# Patient Record
Sex: FEMALE | Race: WHITE | Employment: FULL TIME | URBAN - NONMETROPOLITAN AREA
[De-identification: names, ages, dates, MRNs, and addresses within clinical notes are randomized per-mention and may not be internally consistent; named-entity substitution may affect disease eponyms.]

---

## 2017-01-30 RX ORDER — LIOTHYRONINE SODIUM 25 UG/1
12.5 TABLET ORAL DAILY
Qty: 45 TABLET | Refills: 3 | Status: SHIPPED | OUTPATIENT
Start: 2017-01-30 | End: 2021-07-02

## 2017-11-07 ENCOUNTER — HOSPITAL ENCOUNTER (OUTPATIENT)
Dept: INTERVENTIONAL RADIOLOGY/VASCULAR | Age: 62
Discharge: HOME | End: 2017-11-07
Payer: COMMERCIAL

## 2017-11-07 DIAGNOSIS — D68.51 FACTOR 5 LEIDEN MUTATION, HETEROZYGOUS (HCC): ICD-10-CM

## 2017-11-07 PROCEDURE — 93970 EXTREMITY STUDY: CPT

## 2018-01-09 ENCOUNTER — HOSPITAL ENCOUNTER (OUTPATIENT)
Age: 63
Discharge: HOME | End: 2018-01-09
Payer: COMMERCIAL

## 2018-01-09 LAB
BACTERIA: ABNORMAL
BILIRUBIN URINE: NEGATIVE
BLOOD, URINE: NEGATIVE
CASTS: ABNORMAL /LPF
CASTS: ABNORMAL /LPF
CHARACTER, URINE: CLEAR
COLOR: YELLOW
CRYSTALS: ABNORMAL
EPITHELIAL CELLS, UA: ABNORMAL /HPF
GLUCOSE, URINE: NEGATIVE MG/DL
KETONES, URINE: NEGATIVE
LEUKOCYTE EST, POC: ABNORMAL
MISCELLANEOUS LAB TEST RESULT: ABNORMAL
NITRITE, URINE: NEGATIVE
PH UA: 5.5
PROTEIN UA: NEGATIVE MG/DL
RBC URINE: ABNORMAL /HPF
RENAL EPITHELIAL, UA: ABNORMAL
SPECIFIC GRAVITY UA: 1.01 (ref 1–1.03)
UROBILINOGEN, URINE: 0.2 EU/DL
WBC UA: ABNORMAL /HPF
YEAST: ABNORMAL

## 2018-01-09 PROCEDURE — 87086 URINE CULTURE/COLONY COUNT: CPT

## 2018-01-09 PROCEDURE — 81001 URINALYSIS AUTO W/SCOPE: CPT

## 2018-01-10 LAB
ORGANISM: ABNORMAL
URINE CULTURE, ROUTINE: ABNORMAL

## 2018-01-19 ENCOUNTER — HOSPITAL ENCOUNTER (OUTPATIENT)
Age: 63
Setting detail: SPECIMEN
Discharge: HOME | End: 2018-01-19
Payer: COMMERCIAL

## 2018-01-19 LAB — INR BLD: 1.44 (ref 0.85–1.13)

## 2018-01-19 PROCEDURE — 85610 PROTHROMBIN TIME: CPT

## 2018-01-22 ENCOUNTER — HOSPITAL ENCOUNTER (OUTPATIENT)
Age: 63
Setting detail: SPECIMEN
Discharge: HOME | End: 2018-01-22
Payer: COMMERCIAL

## 2018-01-22 LAB — INR BLD: 1.88 (ref 0.85–1.13)

## 2018-01-22 PROCEDURE — 85610 PROTHROMBIN TIME: CPT

## 2018-01-26 ENCOUNTER — HOSPITAL ENCOUNTER (OUTPATIENT)
Age: 63
Setting detail: SPECIMEN
Discharge: HOME | End: 2018-01-26
Payer: COMMERCIAL

## 2018-01-26 LAB — INR BLD: 1.82 (ref 0.85–1.13)

## 2018-01-26 PROCEDURE — 85610 PROTHROMBIN TIME: CPT

## 2018-01-29 ENCOUNTER — HOSPITAL ENCOUNTER (OUTPATIENT)
Age: 63
Setting detail: SPECIMEN
Discharge: HOME | End: 2018-01-29
Payer: COMMERCIAL

## 2018-01-29 LAB — INR BLD: 2.11 (ref 0.85–1.13)

## 2018-01-29 PROCEDURE — 85610 PROTHROMBIN TIME: CPT

## 2018-02-01 ENCOUNTER — HOSPITAL ENCOUNTER (OUTPATIENT)
Age: 63
Setting detail: SPECIMEN
Discharge: HOME | End: 2018-02-01
Payer: COMMERCIAL

## 2018-02-01 LAB — INR BLD: 2.22 (ref 0.85–1.13)

## 2018-02-01 PROCEDURE — 85610 PROTHROMBIN TIME: CPT

## 2018-02-08 ENCOUNTER — HOSPITAL ENCOUNTER (OUTPATIENT)
Age: 63
Setting detail: SPECIMEN
Discharge: HOME | End: 2018-02-08
Payer: COMMERCIAL

## 2018-02-09 ENCOUNTER — HOSPITAL ENCOUNTER (OUTPATIENT)
Age: 63
Setting detail: SPECIMEN
Discharge: HOME | End: 2018-02-09
Payer: COMMERCIAL

## 2018-02-09 LAB — INR BLD: 2.81 (ref 0.85–1.13)

## 2018-02-09 PROCEDURE — 85610 PROTHROMBIN TIME: CPT

## 2018-02-14 ENCOUNTER — HOSPITAL ENCOUNTER (OUTPATIENT)
Age: 63
Setting detail: SPECIMEN
Discharge: HOME | End: 2018-02-14
Payer: COMMERCIAL

## 2018-02-14 LAB — INR BLD: 1.71 (ref 0.85–1.13)

## 2018-02-14 PROCEDURE — 85610 PROTHROMBIN TIME: CPT

## 2018-09-25 ENCOUNTER — HOSPITAL ENCOUNTER (OUTPATIENT)
Dept: GENERAL RADIOLOGY | Age: 63
Discharge: HOME | End: 2018-09-25
Payer: COMMERCIAL

## 2018-09-25 ENCOUNTER — HOSPITAL ENCOUNTER (OUTPATIENT)
Age: 63
Discharge: HOME | End: 2018-09-25
Payer: COMMERCIAL

## 2018-09-25 DIAGNOSIS — M47.817 ARTHRITIS OF LUMBOSACRAL SPINE: ICD-10-CM

## 2018-09-25 PROCEDURE — 72110 X-RAY EXAM L-2 SPINE 4/>VWS: CPT

## 2019-06-11 ENCOUNTER — HOSPITAL ENCOUNTER (OUTPATIENT)
Age: 64
Discharge: HOME | End: 2019-06-11
Payer: COMMERCIAL

## 2019-06-11 ENCOUNTER — HOSPITAL ENCOUNTER (OUTPATIENT)
Dept: GENERAL RADIOLOGY | Age: 64
Discharge: HOME | End: 2019-06-11
Payer: COMMERCIAL

## 2019-06-11 DIAGNOSIS — M25.561 RIGHT KNEE PAIN, UNSPECIFIED CHRONICITY: ICD-10-CM

## 2019-06-11 PROCEDURE — 73564 X-RAY EXAM KNEE 4 OR MORE: CPT

## 2019-06-24 ENCOUNTER — HOSPITAL ENCOUNTER (OUTPATIENT)
Dept: MRI IMAGING | Age: 64
Discharge: HOME | End: 2019-06-24
Payer: COMMERCIAL

## 2019-06-24 DIAGNOSIS — M25.561 RIGHT KNEE PAIN, UNSPECIFIED CHRONICITY: ICD-10-CM

## 2019-06-24 PROCEDURE — 73721 MRI JNT OF LWR EXTRE W/O DYE: CPT

## 2019-07-15 ENCOUNTER — HOSPITAL ENCOUNTER (OUTPATIENT)
Dept: INTERVENTIONAL RADIOLOGY/VASCULAR | Age: 64
Discharge: HOME | End: 2019-07-15

## 2019-07-15 DIAGNOSIS — Z13.6 ENCOUNTER FOR SCREENING FOR VASCULAR DISEASE: ICD-10-CM

## 2019-07-15 PROCEDURE — 9900000021 US VASCULAR SCREENING

## 2019-08-12 ENCOUNTER — HOSPITAL ENCOUNTER (OUTPATIENT)
Dept: GENERAL RADIOLOGY | Age: 64
Discharge: HOME | End: 2019-08-12
Payer: COMMERCIAL

## 2019-08-12 ENCOUNTER — HOSPITAL ENCOUNTER (OUTPATIENT)
Age: 64
Discharge: HOME | End: 2019-08-12
Payer: COMMERCIAL

## 2019-08-12 DIAGNOSIS — S83.241S ACUTE MEDIAL MENISCUS TEAR OF RIGHT KNEE, SEQUELA: ICD-10-CM

## 2019-08-12 DIAGNOSIS — Z01.811 PRE-OP CHEST EXAM: ICD-10-CM

## 2019-08-12 LAB
ANION GAP SERPL CALCULATED.3IONS-SCNC: 10 MEQ/L (ref 8–16)
BUN BLDV-MCNC: 10 MG/DL (ref 7–22)
CALCIUM SERPL-MCNC: 9.5 MG/DL (ref 8.5–10.5)
CHLORIDE BLD-SCNC: 104 MEQ/L (ref 98–111)
CO2: 27 MEQ/L (ref 23–33)
CREAT SERPL-MCNC: 0.9 MG/DL (ref 0.4–1.2)
EKG ATRIAL RATE: 65 BPM
EKG P AXIS: 6 DEGREES
EKG P-R INTERVAL: 148 MS
EKG Q-T INTERVAL: 440 MS
EKG QRS DURATION: 76 MS
EKG QTC CALCULATION (BAZETT): 457 MS
EKG R AXIS: 42 DEGREES
EKG T AXIS: 55 DEGREES
EKG VENTRICULAR RATE: 65 BPM
ERYTHROCYTE [DISTWIDTH] IN BLOOD BY AUTOMATED COUNT: 13.4 % (ref 11.5–14.5)
ERYTHROCYTE [DISTWIDTH] IN BLOOD BY AUTOMATED COUNT: 45.2 FL (ref 35–45)
GFR SERPL CREATININE-BSD FRML MDRD: 63 ML/MIN/1.73M2
GLUCOSE BLD-MCNC: 81 MG/DL (ref 70–108)
HCT VFR BLD CALC: 42.6 % (ref 37–47)
HEMOGLOBIN: 13.2 GM/DL (ref 12–16)
MCH RBC QN AUTO: 28.5 PG (ref 26–33)
MCHC RBC AUTO-ENTMCNC: 31 GM/DL (ref 32.2–35.5)
MCV RBC AUTO: 92 FL (ref 81–99)
PLATELET # BLD: 312 THOU/MM3 (ref 130–400)
PMV BLD AUTO: 10.4 FL (ref 9.4–12.4)
POTASSIUM SERPL-SCNC: 4.6 MEQ/L (ref 3.5–5.2)
RBC # BLD: 4.63 MILL/MM3 (ref 4.2–5.4)
SODIUM BLD-SCNC: 141 MEQ/L (ref 135–145)
WBC # BLD: 5.8 THOU/MM3 (ref 4.8–10.8)

## 2019-08-12 PROCEDURE — 93005 ELECTROCARDIOGRAM TRACING: CPT | Performed by: ORTHOPAEDIC SURGERY

## 2019-08-12 PROCEDURE — 71046 X-RAY EXAM CHEST 2 VIEWS: CPT

## 2019-08-12 PROCEDURE — 36415 COLL VENOUS BLD VENIPUNCTURE: CPT

## 2019-08-12 PROCEDURE — 85027 COMPLETE CBC AUTOMATED: CPT

## 2019-08-12 PROCEDURE — 93010 ELECTROCARDIOGRAM REPORT: CPT | Performed by: INTERNAL MEDICINE

## 2019-08-12 PROCEDURE — 80048 BASIC METABOLIC PNL TOTAL CA: CPT

## 2021-05-28 LAB
BILIRUBIN URINE: NORMAL
BLOOD, URINE: NEGATIVE
CLARITY: CLEAR
COLOR: YELLOW
GLUCOSE URINE: NEGATIVE
KETONES, URINE: NEGATIVE
LEUKOCYTE ESTERASE, URINE: NORMAL
NITRITE, URINE: NEGATIVE
PH UA: 6 (ref 4.5–8)
PROTEIN UA: NEGATIVE
SPECIFIC GRAVITY UA: 1.01 (ref 1–1.03)
UROBILINOGEN, URINE: NORMAL

## 2021-07-02 ENCOUNTER — OFFICE VISIT (OUTPATIENT)
Dept: UROLOGY | Age: 66
End: 2021-07-02
Payer: COMMERCIAL

## 2021-07-02 VITALS
WEIGHT: 241 LBS | HEIGHT: 67 IN | BODY MASS INDEX: 37.83 KG/M2 | DIASTOLIC BLOOD PRESSURE: 72 MMHG | SYSTOLIC BLOOD PRESSURE: 118 MMHG

## 2021-07-02 DIAGNOSIS — R32 URINARY INCONTINENCE, UNSPECIFIED TYPE: ICD-10-CM

## 2021-07-02 DIAGNOSIS — R32 ENURESIS: Primary | ICD-10-CM

## 2021-07-02 DIAGNOSIS — R31.29 MICROSCOPIC HEMATURIA: ICD-10-CM

## 2021-07-02 LAB
AMORPHOUS: ABNORMAL
BACTERIA: ABNORMAL
BILIRUBIN URINE: NEGATIVE
BILIRUBIN URINE: NEGATIVE
BLOOD URINE, POC: ABNORMAL
BLOOD, URINE: ABNORMAL
CASTS: ABNORMAL /LPF
CASTS: ABNORMAL /LPF
CHARACTER, URINE: ABNORMAL
CHARACTER, URINE: CLEAR
COLOR, URINE: YELLOW
COLOR: YELLOW
CRYSTALS: ABNORMAL
CRYSTALS: ABNORMAL
EPITHELIAL CELLS, UA: ABNORMAL /HPF
GLUCOSE URINE: NEGATIVE MG/DL
GLUCOSE, URINE: NEGATIVE MG/DL
KETONES, URINE: ABNORMAL
KETONES, URINE: NEGATIVE
LEUKOCYTE CLUMPS, URINE: NEGATIVE
LEUKOCYTE ESTERASE, URINE: NEGATIVE
MISCELLANEOUS LAB TEST RESULT: ABNORMAL
NITRITE, URINE: NEGATIVE
NITRITE, URINE: NEGATIVE
PH UA: 6 (ref 5–9)
PH, URINE: 5.5 (ref 5–9)
POST VOID RESIDUAL (PVR): 96 ML
PROTEIN UA: ABNORMAL MG/DL
PROTEIN, URINE: 30 MG/DL
RBC URINE: ABNORMAL /HPF
RENAL EPITHELIAL, UA: ABNORMAL
SPECIFIC GRAVITY UA: 1.02 (ref 1–1.03)
SPECIFIC GRAVITY, URINE: 1.02 (ref 1–1.03)
UROBILINOGEN, URINE: 1 EU/DL (ref 0–1)
UROBILINOGEN, URINE: 1 EU/DL (ref 0–1)
WBC UA: ABNORMAL /HPF
YEAST: ABNORMAL

## 2021-07-02 PROCEDURE — 81003 URINALYSIS AUTO W/O SCOPE: CPT | Performed by: NURSE PRACTITIONER

## 2021-07-02 PROCEDURE — 99204 OFFICE O/P NEW MOD 45 MIN: CPT | Performed by: NURSE PRACTITIONER

## 2021-07-02 PROCEDURE — 51798 US URINE CAPACITY MEASURE: CPT | Performed by: NURSE PRACTITIONER

## 2021-07-02 RX ORDER — OXYBUTYNIN CHLORIDE 5 MG/1
5 TABLET, EXTENDED RELEASE ORAL DAILY
Qty: 30 TABLET | Refills: 2 | Status: SHIPPED | OUTPATIENT
Start: 2021-07-02 | End: 2021-08-13

## 2021-07-02 RX ORDER — GABAPENTIN 600 MG/1
600 TABLET ORAL 3 TIMES DAILY
COMMUNITY

## 2021-07-02 ASSESSMENT — ENCOUNTER SYMPTOMS
ABDOMINAL PAIN: 0
VOMITING: 0
BACK PAIN: 0
NAUSEA: 0

## 2021-07-02 NOTE — PROGRESS NOTES
[address] [address] [place] [id number]  Dept: [phone]  Loc: [phone]    Visit Date: 7/2/2021        HPI:     Yusuf Damon is a 77 y.o. female who presents today for:  Chief Complaint   Patient presents with    Advice Only     new pt urinary incontinence and enuresis        HPI   Pt referred to our office for urinary incontinence. Pt reports some urinary incontinence for years that has worsened in the last year. Denies significant frequency or urgency. Goes to the bathroom approximately 5 x per day. Really just has episodes of incontinence. Reports some stress incontinence but the episodes are really not associated with urgency. Notes \"it happens and I don't really have control\". She wears protection. Reports she has had to increase to the #4 pads during the day and the #5 pads at night. Sometimes has incontinence without waking. Uses 1-2 pads per night and 1-2 per day. Drinks 1 large hot tea over the course of the morning and usually 3 root beers per day. Denies alcohol use. Has hx of 3 vaginal deliveries and a tubal ligation. No urologic surgeries. Notes a few scattered utis over the years but no consistent infections. She is a nonsmoker. Pt on coumadin for hx of factor V leiden and blood clots. No dysuria, abdominal or flank pain, fever/chills, gross hematuria. Current Outpatient Medications   Medication Sig Dispense Refill    gabapentin (NEURONTIN) 600 MG tablet Take 600 mg by mouth 3 times daily.  Ascorbic Acid (VITAMIN C PO) Take by mouth      ZINC PO Take by mouth daily      VITAMIN A PO Take by mouth daily      vitamin D (CHOLECALCIFEROL) 5000 UNITS CAPS capsule Take 7,500 Units by mouth daily Alternate between 5000 and 25895      TraMADol HCl  MG CP24 Take 100 mg by mouth 3 times daily.  atorvastatin (LIPITOR) 40 MG tablet Take 40 mg by mouth daily.  warfarin (COUMADIN) 5 MG tablet Take by mouth Fluctuates due to factor 5, controlled by Dr. Marybel Ortega       No current facility-administered medications for this visit. Past Medical History  Brittany Rose  has a past medical history of Arthritis, Factor V deficiency ([address] Hx of blood clots, Hyperlipidemia, and Hypothyroidism due to acquired atrophy of thyroid. Past Surgical History  The patient  has a past surgical history that includes Toe Surgery (2004? ?); Tubal ligation (1996); Colonoscopy; Foot surgery (Left, 09/24/14); and joint replacement (2009/2018). Family History  This patient's family history includes COPD in her father; Cancer in her father and paternal grandmother; Heart Attack in her paternal grandfather; Heart Disease in her paternal grandfather; High Blood Pressure in her father and mother; High Cholesterol in her mother; Other in her maternal grandmother, mother, and sister; Stroke in her maternal grandfather. Social History  Brittany Rose  reports that she has never smoked. She has never used smokeless tobacco. She reports current alcohol use. She reports that she does not use drugs. Subjective:      Review of Systems   Constitutional: Negative for activity change, appetite change, chills, diaphoresis, fatigue, fever and unexpected weight change. Gastrointestinal: Negative for abdominal pain, nausea and vomiting. Genitourinary: Negative for decreased urine volume, difficulty urinating, dysuria, flank pain, frequency, hematuria and urgency. Musculoskeletal: Negative for back pain. Objective:   /72   Ht 5' 7\" (1.702 m)   Wt 241 lb (109.3 kg)   BMI 37.75 kg/m²     Physical Exam  Vitals reviewed. Constitutional:       General: She is not in acute distress. Appearance: Normal appearance. She is well-developed. She is not ill-appearing or diaphoretic. HENT:      Head: Normocephalic and atraumatic.       Right Ear: External ear normal.      Left Ear: External ear normal.      Nose: Nose normal.      Mouth/Throat:      Mouth: Mucous membranes are moist.   Eyes:      General: No scleral icterus. Right eye: No discharge. Left eye: No discharge. Neck:      Vascular: No JVD. Trachea: No tracheal deviation. Pulmonary:      Effort: Pulmonary effort is normal. No respiratory distress. Abdominal:      General: There is no distension. Tenderness: There is no abdominal tenderness. There is no right CVA tenderness or left CVA tenderness. Musculoskeletal:         General: No tenderness. Normal range of motion. Neurological:      Mental Status: She is alert and oriented to person, place, and time. Mental status is at baseline. Psychiatric:         Mood and Affect: Mood normal.         Behavior: Behavior normal.         Thought Content: Thought content normal.         POC  Results for POC orders placed in visit on 07/02/21   POCT Urinalysis No Micro (Auto)   Result Value Ref Range    Glucose, Ur Negative NEGATIVE mg/dl    Bilirubin Urine Negative     Ketones, Urine Trace (A) NEGATIVE    Specific Gravity, Urine 1.025 1.002 - 1.030    Blood, UA POC Large (A) NEGATIVE    pH, Urine 5.50 5.0 - 9.0    Protein, Urine 30 (A) NEGATIVE mg/dl    Urobilinogen, Urine 1.00 0.0 - 1.0 eu/dl    Nitrite, Urine Negative NEGATIVE    Leukocyte Clumps, Urine Negative NEGATIVE    Color, Urine Yellow YELLOW-STRAW    Character, Urine Clear CLR-SL.CLOUD   poct post void residual   Result Value Ref Range    post void residual 96 ml     Patients recent PSA values are as follows  No results found for: PSA, PSADIA     Recent BUN/Creatinine:  Lab Results   Component Value Date    BUN 10 08/12/2019    CREATININE 0.9 08/12/2019       Assessment:   Incontinence with enuresis  Micro hematuria on urine dip    Plan:     Pt's PVR on high end of normal at 96 mls. Pt denies feeling of incomplete emptying or urinary retention.   Discussed voiding techniques and timed voiding of every 3-4 hours during the day time. Repeat PVR at next visit. Trial oxybutynin 5 mg XL daily. Discussed side effects. Send urine for micro and culture.      F/u in 4-6 weeks with PVR

## 2021-07-30 ENCOUNTER — HOSPITAL ENCOUNTER (OUTPATIENT)
Dept: PHYSICAL THERAPY | Age: 66
Setting detail: THERAPIES SERIES
Discharge: HOME | End: 2021-07-30
Payer: COMMERCIAL

## 2021-07-30 PROCEDURE — 97162 PT EVAL MOD COMPLEX 30 MIN: CPT

## 2021-07-30 NOTE — PROGRESS NOTES
** PLEASE SIGN, DATE AND TIME CERTIFICATION BELOW AND RETURN TO [facility] OUTPATIENT REHABILITATION (FAX #: [phone]). ATTEST/CO-SIGN IF ACCESSING VIA [employer]. THANK YOU.**    I certify that I have examined the patient below and determined that Physical Medicine and Rehabilitation service is necessary and that I approve the established plan of care for up to 90 days or as specifically noted. Attestation, signature or co-signature of physician indicates approval of certification requirements.    ________________________ ____________ __________  Physician Signature   Date   Time  [address]  PHYSICAL THERAPY  [x] EVALUATION  [] DAILY NOTE (LAND) [] DAILY NOTE (AQUATIC ) [] PROGRESS NOTE [] DISCHARGE NOTE    [x] [facility]   [] [place] [address]    [] [facility]   [] Pinky Lefort    Date: 2021  Patient Name:  Bhargavi Bernal  : 1955  MRN: [id number]  CSN: [id number]    Referring Practitioner Lloyd Ohara PA-C   Diagnosis Trochanteric bursitis, unspecified hip [M70.60]  Sacrococcygeal disorders, not elsewhere classified [M53.3]  Spondylosis without myelopathy or radiculopathy, lumbosacral region [M47.817]    Treatment Diagnosis Bilateral hip pain, lumbosacral pain with limited functional mobility with walking, transfers, unable to lie on left/right side. Date of Evaluation 21    Additional Pertinent History Right THR 2018, left THR ,  incontinence, obesity, arthritis. Kellen Rides off bike 2021 right knee pain      Functional Outcome Measure Used Modified Oswestry   Functional Outcome Score 40% 20/50 (21)       Insurance: Primary: Payor: Hakeem Loredo 150 /  /  / ,   Secondary:    Authorization Information: No precertification, no visit limit, aquatic covered, modalities covered, telehealth covered.     Visit # 1, 1/10 for progress note   Visits Allowed: Based on medical necessity    Recertification Date: 9/24/2021   Physician Follow-Up:    Physician Orders: PT 3x per week for 6 weeks evaluate and treat. See above for diagnosis. History of Present Illness: Patient has had low back pain >10 years. Follows with chiropractor 1x per month to manage pain and alignment. Patient reports of DDD at lumbar spine. Has trochanteric bursits in both hips within last 6 months to 1 year. SUBJECTIVE: Patient reports of back pain at waist height and below. Reports of numbness, tingling and burning at left thigh since left THR 2009. Back pain is aggravated with walking >10 minutes, sitting depending on surface she is sitting on can sit >2 hours in recliner chair. Cannot lay on right/left sides due to bursitis. Bending, lifting and pushing vacuum can aggravate back as well. To make back feel better she changes position and sits down. Most comfortable sitting in recliner. She does have an adjustable bed in which she elevates head of bed and flexes at hips/knees. Tramadol, Gabapentin for back pain. Bilateral hip pain:  cannot lay on her sides, standing/walking, pivoting and twisting can aggravate hip bursitis. Does have to watch how she gets in the car and cannot swing legs into car. Has to sit on side of seat and lift legs in. SUV seats make it easier to get into car. To ease pain she changes positions and does not lay on her sides. Injection in both hips in May of 2021 which did help but is wearing off. Biofreeze at times to help discomfort. Social/Functional History and Current Status:  Medications and Allergies have been reviewed and are listed on Medical History Questionnaire. Errol Kirk lives with spouse, who had stroke right sided weakness in a multiple floor home with 5 steps with railing. .    Task Previous Current   ADLs  Independent Independent   IADL's Independent Modified Independent careful with sit<>stand transfers, difficulty vacuuming, bending forward, limits lifting.     Ambulation Independent Modified Independent uses cane if hips bother her too much. History of bulging disc and then uses cane as well. Transfers Independent Modified Independent   Recreation crocheting with prolonged sitting, sewing, crafting Independent   Community Integration Independent Independent   Driving Active  Active    Work  full time with prolonged sitting, desk/computerwork. Full-Time. see previous. OBJECTIVE:    Pain: Lumbar pain 2/10 across lower back waist and below. Hip bursitis right/left greater trochanter 6/10. Palpation Note tenderness lumbar sacral region. [id number] levels and laterally. Tenderness along bilateral IT band regions right/left. Greater trochanter region right/left. IT band region proximal to mid lateral thigh   Observation Note use of armrests of chair to sit to stand with delayed timing in sit to stand and straightening back/spine. Posture Note left iliac crest higher than left with slight lateral shift of trunk. (patient reports of leg length descrepency since THR left) Note reduced lumbar lordotic curve. Note genu valgus of LEs. Range of Motion Trunk Forward flexion 20 inches fingertips from floor with 75% restriction, extension only at neutral. Extension/flexion aggravate lumbar spine. HIp ROM right 115 degrees flexion SLR 60 degrees, left 90 degrees flexion SLR 45 degrees. (s/p THR bilaterally)    Strength MMT not assessed at hip flexors due to painful with AROM, knee and ankle strength bilaterally WNLS. 5/5, hip abduction 4/5 to 4-/5, hip extensors 4-/5. Oliva Sarah Sensation Constant left anterior lateral thigh since left THR. Otherwise sensation normal in LEs. Bed Mobility Modified independently. Transfers Modified independent: sit <>stand with use of UEs for support. Ambulation Ambulates with decreased wt shifts left >right, Trendelenberg. Balance SLS right 5 seconds, left 8 seconds, Tandem stance r/l, l/r >30 seconds.     Special Tests Negative neural tension test right/left LEs. Movement testing: optimal lumbar neutral with limits of extension past neutral, Position of comfort supine in hooklying. TREATMENT   Precautions: History of left/right THR. NO US. Pain: 2/10 lumbosacral 6/10 bilateral hips. X in shaded column indicates activity completed today   Modalities Parameters/  Location  Notes                     Manual Therapy Time/Technique  Notes                     Exercise/Intervention   Notes                                                                                  Specific Interventions Next Treatment: Manual therapy soft tissue mobilization bilateral IT band region right/left, core control/strengthening, hip strengthening for muscle imbalance, balance retraining/proprioception. Activity/Treatment Tolerance:  [x]  Patient tolerated treatment well  []  Patient limited by fatigue  []  Patient limited by pain   []  Patient limited by medical complications  []  Other:     Assessment: Patient referred to PT with bilateral hip and lumbosacral pain. Noted muscle imbalances with decreased strength at hip abductors, ER and hip extensors bilaterally. Decreased core strength and lumbar pain and bilateral hip pain and IT band tenderness. Will follow 2-3x per week to address these deficits. Body Structures/Functions/Activity Limitations: impaired activity tolerance, impaired balance, impaired ROM, impaired strength, pain and abnormal posture  Prognosis: good    GOALS:  Patient Goal: Less pain at hips and back. Be able to lie on right/left sides. Walk >10 minutes    Short Term Goals:  Time Frame: 4 weeks  1. Patient to report of decreased pain symptoms at lumbosacral region with pain levels from 2/10 to 0/10 with walking, bending forward, lifting and vacuuming.   2. Patient to report of decreased bilateral hip pain with improved strength at hip musculature with decreased strain at hips at abductors with pain level at hips from 6/10 to 3/10. 3. Patient to demonstrate increased core control with increased strength at abdominal and trunk muscles with ability to complete ex in optimal lumbar neutral position for a series of exercises at 15 reps each in supine in hooklying, seated and standing positions. 4. Patient to demonstrate increased bilateral hip strength at hip abductors 4-/5 to 4/5, hip flexors 4-/5 to 4/5, hip extension 3+/5 to 4-/5 to 4/5 with improved muscle balance at hip and trunk muscles and progression in ex noted. 5. Note increased balance in SLS 5-8 seconds to 10-12 seconds with improved balance through LLE and RLE. Tandem stance from 30 seconds to 45 seconds. Long Term Goals:  Time Frame: 8 weeks  1. Oswestry 40% to 20%. 2. Patient independent in HEP with follow through in order to increase strength core control, hip strength to correct muscle imbalances, decrease pain at bilateral hips. Patient Education:   [x]  HEP/Education Completed: Plan of Care, Goals,    Medbridge Access Code:  []  No new Education completed  []  Reviewed Prior HEP      []  Patient verbalized and/or demonstrated understanding of education provided. []  Patient unable to verbalize and/or demonstrate understanding of education provided. Will continue education. [x]  Barriers to learning: none    PLAN:  Treatment Recommendations: Strengthening, Range of Motion, Balance Training, Functional Mobility Training, Transfer Training, Neuromuscular Re-education, Manual Therapy - Soft Tissue Mobilization, Pain Management, Home Exercise Program, Patient Education, Aquatics and Modalities    [x]  Plan of care initiated. Plan to see patient 2-3 times per week for 8 weeks to address the treatment planned outlined above.   []  Continue with current plan of care  []  Modify plan of care as follows:    []  Hold pending physician visit  []  Discharge    Time In 1310   Time Out 1350   Timed Code Minutes: 0 min   Total Treatment Time: 40 min Electronically Signed by: Sims Mcardle, PT

## 2021-08-03 ENCOUNTER — HOSPITAL ENCOUNTER (OUTPATIENT)
Dept: PHYSICAL THERAPY | Age: 66
Setting detail: THERAPIES SERIES
Discharge: HOME | End: 2021-08-03
Payer: COMMERCIAL

## 2021-08-03 PROCEDURE — 97110 THERAPEUTIC EXERCISES: CPT

## 2021-08-03 PROCEDURE — 97140 MANUAL THERAPY 1/> REGIONS: CPT

## 2021-08-03 NOTE — PROGRESS NOTES
[address] [facility]  PHYSICAL THERAPY  [] EVALUATION  [x] DAILY NOTE (LAND) [] DAILY NOTE (AQUATIC ) [] PROGRESS NOTE [] DISCHARGE NOTE    [x] OUTPATIENT REHABILITATION CENTER [facility]   [] [place] [address]    [] [facility]   [] Merlene Humphrey    Date: 8/3/2021  Patient Name:  Jerrod Franklin  : 1955  MRN: [id number]  CSN: [id number]    Referring Practitioner Charleen Lynn PA-C   Diagnosis Trochanteric bursitis, unspecified hip [M70.60]  Sacrococcygeal disorders, not elsewhere classified [M53.3]  Spondylosis without myelopathy or radiculopathy, lumbosacral region [M47.817]    Treatment Diagnosis Bilateral hip pain, lumbosacral pain with limited functional mobility with walking, transfers, unable to lie on left/right side. Date of Evaluation 21    Additional Pertinent History Right THR 2018, left THR ,  incontinence, obesity, arthritis. Merly Half off bike 2021 right knee pain      Functional Outcome Measure Used Modified Oswestry   Functional Outcome Score 40% 20/50 (21)       Insurance: Primary: Payor: Tristen Crowell /  /  / ,   Secondary:    Authorization Information: No precertification, no visit limit, aquatic covered, modalities covered, telehealth covered. Visit # 2, 2/10 for progress note   Visits Allowed: Based on medical necessity    Recertification Date: 3/45/9790   Physician Follow-Up:    Physician Orders: PT 3x per week for 6 weeks evaluate and treat. See above for diagnosis. History of Present Illness: Patient has had low back pain >10 years. Follows with chiropractor 1x per month to manage pain and alignment. Patient reports of DDD at lumbar spine. Has trochanteric bursits in both hips within last 6 months to 1 year. SUBJECTIVE: Patient reports back pain 5/10 today along B lower lumbar/SI area and into hips. Patient has more pain along L hip/SI area today than R side.     Social/Functional History and Current Status:  Medications and Allergies have been reviewed and are listed on Medical History Questionnaire. Alberto Villagomez lives with spouse, who had stroke right sided weakness in a multiple floor home with 5 steps with railing. .      TREATMENT   Precautions: History of left/right THR. NO US. Pain: 2/10 lumbosacral 5/10 bilateral hips. X in shaded column indicates activity completed today   Modalities Parameters/  Location  Notes                     Manual Therapy Time/Technique  Notes   Manual therapy soft tissue mobilization bilateral IT band region B along with B SI area 10 min  Patient prone with 1 pillow under chest               Exercise/Intervention   Notes   Abdominal bracing  5 sec 10x X Cues for breathing    Pelvic tilt 5 sec 10x X  Cues for breathing          Lower trunk rolls  5 sec 5x                   Supine HS stretch 10 sec 3x  Hold towel behind knee                                 Specific Interventions Next Treatment: Manual therapy soft tissue mobilization bilateral IT band region right/left, core control/strengthening, hip strengthening for muscle imbalance, balance retraining/proprioception. Activity/Treatment Tolerance:  [x]  Patient tolerated treatment well  [x]  Patient limited by fatigue  []  Patient limited by pain   []  Patient limited by medical complications  []  Other:     Assessment: Patient received manual therapy soft tissue mobilization to bilateral IT band region B and along B SI area to decrease pain and soft tissue restriction. Initiated there ex including abdominal bracing, pelvic tilt, LTR and supine HS stretch. Patient required cues for abdominal bracing today. Patient given updated HEP with handouts to complete 2-3 times per day. Patient noted fatigue post treatment. Prognosis: good    GOALS:  Patient Goal: Less pain at hips and back. Be able to lie on right/left sides. Walk >10 minutes    Short Term Goals:  Time Frame: 4 weeks  1.  Patient to report of decreased pain symptoms at lumbosacral region with pain levels from 2/10 to 0/10 with walking, bending forward, lifting and vacuuming. 2. Patient to report of decreased bilateral hip pain with improved strength at hip musculature with decreased strain at hips at abductors with pain level at hips from 6/10 to 3/10. 3. Patient to demonstrate increased core control with increased strength at abdominal and trunk muscles with ability to complete ex in optimal lumbar neutral position for a series of exercises at 15 reps each in supine in hooklying, seated and standing positions. 4. Patient to demonstrate increased bilateral hip strength at hip abductors 4-/5 to 4/5, hip flexors 4-/5 to 4/5, hip extension 3+/5 to 4-/5 to 4/5 with improved muscle balance at hip and trunk muscles and progression in ex noted. 5. Note increased balance in SLS 5-8 seconds to 10-12 seconds with improved balance through LLE and RLE. Tandem stance from 30 seconds to 45 seconds. Long Term Goals:  Time Frame: 8 weeks  1. Oswestry 40% to 20%. 2. Patient independent in HEP with follow through in order to increase strength core control, hip strength to correct muscle imbalances, decrease pain at bilateral hips. Patient Education:   [x]  HEP/Education Completed: Patient given updated HO for HEP to be completed 2-3 times per day.  Pop Up Archive Access Code: [email or web address]  []  No new Education completed  []  Reviewed Prior HEP      []  Patient verbalized and/or demonstrated understanding of education provided. []  Patient unable to verbalize and/or demonstrate understanding of education provided. Will continue education.   [x]  Barriers to learning: none    PLAN:  Treatment Recommendations: Strengthening, Range of Motion, Balance Training, Functional Mobility Training, Transfer Training, Neuromuscular Re-education, Manual Therapy - Soft Tissue Mobilization, Pain Management, Home Exercise Program, Patient Education, Aquatics and Modalities    [x]  Plan of care initiated. Plan to see patient 2-3 times per week for 8 weeks to address the treatment planned outlined above.   []  Continue with current plan of care  []  Modify plan of care as follows:    []  Hold pending physician visit  []  Discharge    Time In 1530   Time Out 1615   Timed Code Minutes: 45min   Total Treatment Time: 45 min       Electronically Signed by: Carlos Cortez PTA

## 2021-08-04 ENCOUNTER — HOSPITAL ENCOUNTER (OUTPATIENT)
Dept: PHYSICAL THERAPY | Age: 66
Setting detail: THERAPIES SERIES
Discharge: HOME | End: 2021-08-04
Payer: COMMERCIAL

## 2021-08-04 PROCEDURE — 97110 THERAPEUTIC EXERCISES: CPT

## 2021-08-04 PROCEDURE — 97140 MANUAL THERAPY 1/> REGIONS: CPT

## 2021-08-04 NOTE — PROGRESS NOTES
[facility]  PHYSICAL THERAPY  [] EVALUATION  [x] DAILY NOTE (LAND) [] DAILY NOTE (AQUATIC ) [] PROGRESS NOTE [] DISCHARGE NOTE    [x] OUTPATIENT REHABILITATION CENTER [facility]   [] [place] [address]    [] [facility]   [] Millie Stanton    Date: 2021  Patient Name:  Alberto Villagomez  : 1955  MRN: [id number]  CSN: [id number]    Referring Practitioner Radha Hunter PA-C   Diagnosis Trochanteric bursitis, unspecified hip [M70.60]  Sacrococcygeal disorders, not elsewhere classified [M53.3]  Spondylosis without myelopathy or radiculopathy, lumbosacral region [M47.817]    Treatment Diagnosis Bilateral hip pain, lumbosacral pain with limited functional mobility with walking, transfers, unable to lie on left/right side. Date of Evaluation 21    Additional Pertinent History Right THR 2018, left THR ,  incontinence, obesity, arthritis. Alonza Citlalli off bike 2021 right knee pain      Functional Outcome Measure Used Modified Oswestry   Functional Outcome Score 40% 20/50 (21)       Insurance: Primary: Payor: [facility] /  /  / ,   Secondary:    Authorization Information: No precertification, no visit limit, aquatic covered, modalities covered, telehealth covered. Visit # 3, 3/10 for progress note   Visits Allowed: Based on medical necessity    Recertification Date:    Physician Follow-Up:    Physician Orders: PT 3x per week for 6 weeks evaluate and treat. See above for diagnosis. History of Present Illness: Patient has had low back pain >10 years. Follows with chiropractor 1x per month to manage pain and alignment. Patient reports of DDD at lumbar spine. Has trochanteric bursits in both hips within last 6 months to 1 year. SUBJECTIVE: Patient denies any adverse effects after yesterday's session. TREATMENT   Precautions: History of left/right THR. NO US.     Pain: 3/10 lumbosacral 5/10 bilateral hips L >R    X in shaded column indicates activity completed today   Modalities Parameters/  Location  Notes                     Manual Therapy Time/Technique  Notes   Manual therapy soft tissue mobilization bilateral IT band region B along with B SI area 15 min X Patient prone with 1 pillow under chest               Exercise/Intervention   Notes   Abdominal bracing  5 sec 10x X Cues for breathing    Pelvic tilt 5 sec 10x X  Cues for breathing and technique    Pelvic tilt with march, bent knee fall out B and U 10x  X    Lower trunk rolls  5 sec 5x X                  Supine HS stretch 15 sec 3x X Hold towel behind knee                                 Specific Interventions Next Treatment: Manual therapy soft tissue mobilization bilateral IT band region right/left, core control/strengthening, hip strengthening for muscle imbalance, balance retraining/proprioception. Activity/Treatment Tolerance:  [x]  Patient tolerated treatment well  [x]  Patient limited by fatigue  []  Patient limited by pain   []  Patient limited by medical complications  []  Other:     Assessment: Good erythemal response is achieved with manual interventions. Patient demonstrates good PPT technique and able to add progressions while maintaining good core stability. GOALS:  Patient Goal: Less pain at hips and back. Be able to lie on right/left sides. Walk >10 minutes    Short Term Goals:  Time Frame: 4 weeks  1. Patient to report of decreased pain symptoms at lumbosacral region with pain levels from 2/10 to 0/10 with walking, bending forward, lifting and vacuuming. 2. Patient to report of decreased bilateral hip pain with improved strength at hip musculature with decreased strain at hips at abductors with pain level at hips from 6/10 to 3/10.   3. Patient to demonstrate increased core control with increased strength at abdominal and trunk muscles with ability to complete ex in optimal lumbar neutral position for a series of exercises at 15 reps each in supine in hooklying, seated and standing positions. 4. Patient to demonstrate increased bilateral hip strength at hip abductors 4-/5 to 4/5, hip flexors 4-/5 to 4/5, hip extension 3+/5 to 4-/5 to 4/5 with improved muscle balance at hip and trunk muscles and progression in ex noted. 5. Note increased balance in SLS 5-8 seconds to 10-12 seconds with improved balance through LLE and RLE. Tandem stance from 30 seconds to 45 seconds. Long Term Goals:  Time Frame: 8 weeks  1. Oswestry 40% to 20%. 2. Patient independent in HEP with follow through in order to increase strength core control, hip strength to correct muscle imbalances, decrease pain at bilateral hips. Patient Education:   [x]  HEP/Education Completed: PPT with angelo bent knee fall outs    [address] [place] [address] Access Code: [email or web address]  []  No new Education completed  [x]  Reviewed Prior HEP      []  Patient verbalized and/or demonstrated understanding of education provided. []  Patient unable to verbalize and/or demonstrate understanding of education provided. Will continue education. [x]  Barriers to learning: none    PLAN:  Treatment Recommendations: Strengthening, Range of Motion, Balance Training, Functional Mobility Training, Transfer Training, Neuromuscular Re-education, Manual Therapy - Soft Tissue Mobilization, Pain Management, Home Exercise Program, Patient Education, Aquatics and Modalities    []  Plan of care initiated. Plan to see patient 2-3 times per week for 8 weeks to address the treatment planned outlined above.   [x]  Continue with current plan of care  []  Modify plan of care as follows:    []  Hold pending physician visit  []  Discharge    Time In 1300   Time Out 1345   Timed Code Minutes: 45 min   Total Treatment Time: 45 min       Electronically Signed by: Brenda Tim PTA

## 2021-08-11 ENCOUNTER — APPOINTMENT (OUTPATIENT)
Dept: PHYSICAL THERAPY | Age: 66
End: 2021-08-11
Payer: COMMERCIAL

## 2021-08-13 ENCOUNTER — HOSPITAL ENCOUNTER (OUTPATIENT)
Dept: PHYSICAL THERAPY | Age: 66
Setting detail: THERAPIES SERIES
Discharge: HOME | End: 2021-08-13
Payer: COMMERCIAL

## 2021-08-13 ENCOUNTER — OFFICE VISIT (OUTPATIENT)
Dept: UROLOGY | Age: 66
End: 2021-08-13
Payer: COMMERCIAL

## 2021-08-13 VITALS — BODY MASS INDEX: 37.83 KG/M2 | WEIGHT: 241 LBS | HEIGHT: 67 IN

## 2021-08-13 DIAGNOSIS — R32 URINARY INCONTINENCE, UNSPECIFIED TYPE: Primary | ICD-10-CM

## 2021-08-13 LAB
BILIRUBIN URINE: NEGATIVE
BLOOD URINE, POC: NEGATIVE
CHARACTER, URINE: CLEAR
COLOR, URINE: YELLOW
GLUCOSE URINE: NEGATIVE MG/DL
KETONES, URINE: NEGATIVE
LEUKOCYTE CLUMPS, URINE: NEGATIVE
NITRITE, URINE: NEGATIVE
PH, URINE: 5.5 (ref 5–9)
POST VOID RESIDUAL (PVR): 43 ML
PROTEIN, URINE: NEGATIVE MG/DL
SPECIFIC GRAVITY, URINE: 1.02 (ref 1–1.03)
UROBILINOGEN, URINE: 1 EU/DL (ref 0–1)

## 2021-08-13 PROCEDURE — 97110 THERAPEUTIC EXERCISES: CPT

## 2021-08-13 PROCEDURE — 99214 OFFICE O/P EST MOD 30 MIN: CPT | Performed by: NURSE PRACTITIONER

## 2021-08-13 PROCEDURE — 81003 URINALYSIS AUTO W/O SCOPE: CPT | Performed by: NURSE PRACTITIONER

## 2021-08-13 PROCEDURE — 51798 US URINE CAPACITY MEASURE: CPT | Performed by: NURSE PRACTITIONER

## 2021-08-13 RX ORDER — OXYBUTYNIN CHLORIDE 10 MG/1
10 TABLET, EXTENDED RELEASE ORAL DAILY
Qty: 30 TABLET | Refills: 2 | Status: SHIPPED | OUTPATIENT
Start: 2021-08-13 | End: 2021-09-28

## 2021-08-13 ASSESSMENT — ENCOUNTER SYMPTOMS
BACK PAIN: 0
VOMITING: 0
ABDOMINAL PAIN: 0
NAUSEA: 0

## 2021-08-13 NOTE — PROGRESS NOTES
Michael [address] [place] [address].  [address]  [facility] [address]  Dept: [phone]  Loc: [phone]    Visit Date: 8/13/2021        HPI:     Cosmo Jung is a 77 y.o. female who presents today for:  Chief Complaint   Patient presents with    Follow-up     OAB incontinence        HPI   Pt referred to our office for urinary incontinence. HX:  Pt reports some urinary incontinence for years that has worsened in the last year. Denies significant frequency or urgency. Goes to the bathroom approximately 5 x per day. Really just has episodes of incontinence. Reports some stress incontinence but the episodes are really not associated with urgency. Notes \"it happens and I don't really have control\".       She wears protection. Reports she has had to increase to the #4 pads during the day and the #5 pads at night. Sometimes has incontinence without waking. Uses 1-2 pads per night and 1-2 per day    Drinks 1 large hot tea over the course of the morning and usually 3 root beers per day. Denies alcohol use.       Has hx of 3 vaginal deliveries and a tubal ligation. No urologic surgeries. Notes a few scattered utis over the years but no consistent infections. She is a nonsmoker. Pt on coumadin for hx of factor V leiden and blood clots. Current visit  Pt reports mild improvement in urinary symptoms with trying to remember to do timed voiding and starting the oxybutynin. No dysuria, hematuria. Reports using 1 pad per day and 1 pad at night. Still using #5 pads. Current Outpatient Medications   Medication Sig Dispense Refill    gabapentin (NEURONTIN) 600 MG tablet Take 600 mg by mouth 3 times daily.       Ascorbic Acid (VITAMIN C PO) Take by mouth      ZINC PO Take by mouth daily      VITAMIN A PO Take by mouth daily      oxybutynin (DITROPAN XL) 5 MG extended release tablet Take 1 tablet by mouth daily 30 tablet 2    vitamin D (CHOLECALCIFEROL) 5000 UNITS CAPS capsule Take 7,500 Units by mouth daily Alternate between 5000 and 94485      TraMADol HCl  MG CP24 Take 100 mg by mouth 3 times daily.  atorvastatin (LIPITOR) 40 MG tablet Take 40 mg by mouth daily.  warfarin (COUMADIN) 5 MG tablet Take by mouth Fluctuates due to factor 5, controlled by Dr. Angi Iglesias       No current facility-administered medications for this visit. Past Medical History  Gladis Ochoa  has a past medical history of Arthritis, Factor V deficiency ([facility] [address] Hx of blood clots, Hyperlipidemia, and Hypothyroidism due to acquired atrophy of thyroid. Past Surgical History  The patient  has a past surgical history that includes Toe Surgery (2004? ?); Tubal ligation (1996); Colonoscopy; Foot surgery (Left, 09/24/14); and joint replacement (2009/2018). Family History  This patient's family history includes COPD in her father; Cancer in her father and paternal grandmother; Heart Attack in her paternal grandfather; Heart Disease in her paternal grandfather; High Blood Pressure in her father and mother; High Cholesterol in her mother; Other in her maternal grandmother, mother, and sister; Stroke in her maternal grandfather. Social History  Gladis Ochoa  reports that she has never smoked. She has never used smokeless tobacco. She reports current alcohol use. She reports that she does not use drugs. Subjective:      Review of Systems   Constitutional: Negative for activity change, appetite change, chills, diaphoresis, fatigue, fever and unexpected weight change. Gastrointestinal: Negative for abdominal pain, nausea and vomiting. Genitourinary: Negative for decreased urine volume, difficulty urinating, dysuria, flank pain, frequency, hematuria and urgency. Musculoskeletal: Negative for back pain. Objective:   Ht 5' 7\" (1.702 m)   Wt 241 lb (109.3 kg)   BMI 37.75 kg/m²     Physical Exam  Vitals reviewed.    Constitutional:       General: She is not in acute distress. Appearance: Normal appearance. She is well-developed. She is not ill-appearing or diaphoretic. HENT:      Head: Normocephalic and atraumatic. Right Ear: External ear normal.      Left Ear: External ear normal.      Nose: Nose normal.      Mouth/Throat:      Mouth: Mucous membranes are moist.   Eyes:      General: No scleral icterus. Right eye: No discharge. Left eye: No discharge. Neck:      Vascular: No JVD. Trachea: No tracheal deviation. Pulmonary:      Effort: Pulmonary effort is normal. No respiratory distress. Musculoskeletal:         General: No tenderness. Normal range of motion. Neurological:      Mental Status: She is alert and oriented to person, place, and time. Mental status is at baseline. Psychiatric:         Mood and Affect: Mood normal.         Behavior: Behavior normal.         Thought Content: Thought content normal.         POC  Results for POC orders placed in visit on 08/13/21   POCT Urinalysis No Micro (Auto)   Result Value Ref Range    Glucose, Ur Negative NEGATIVE mg/dl    Bilirubin Urine Negative     Ketones, Urine Negative NEGATIVE    Specific Gravity, Urine 1.020 1.002 - 1.030    Blood, UA POC Negative NEGATIVE    pH, Urine 5.50 5.0 - 9.0    Protein, Urine Negative NEGATIVE mg/dl    Urobilinogen, Urine 1.00 0.0 - 1.0 eu/dl    Nitrite, Urine Negative NEGATIVE    Leukocyte Clumps, Urine Negative NEGATIVE    Color, Urine Yellow YELLOW-STRAW    Character, Urine Clear [id number].[id number]   poct post void residual   Result Value Ref Range    post void residual 43 ml         Patients recent PSA values are as follows  No results found for: PSA, PSADIA     Recent BUN/Creatinine:  Lab Results   Component Value Date    BUN 10 08/12/2019    CREATININE 0.9 08/12/2019       Assessment:   Incontinence with enuresis      Plan:     Pt having some improvement with the oxybutynin. PVR today 43 mls. Will increase oxybutynin to 10 mg XL daily.   Continue

## 2021-08-13 NOTE — PROGRESS NOTES
[address] [facility]  PHYSICAL THERAPY  [] EVALUATION  [x] DAILY NOTE (LAND) [] DAILY NOTE (AQUATIC ) [] PROGRESS NOTE [] DISCHARGE NOTE    [x] OUTPATIENT REHABILITATION [facility]   [] [place] [address]    [] [facility]   [] Pinky Lefort    Date: 2021  Patient Name:  Bhargavi Bernal  : 1955  MRN: [id number]  CSN: [id number]    Referring Practitioner Lloyd Ohara PA-C   Diagnosis Trochanteric bursitis, unspecified hip [M70.60]  Sacrococcygeal disorders, not elsewhere classified [M53.3]  Spondylosis without myelopathy or radiculopathy, lumbosacral region [M47.817]    Treatment Diagnosis Bilateral hip pain, lumbosacral pain with limited functional mobility with walking, transfers, unable to lie on left/right side. Date of Evaluation 21    Additional Pertinent History Right THR 2018, left THR ,  incontinence, obesity, arthritis. Kellen Rides off bike 2021 right knee pain      Functional Outcome Measure Used Modified Oswestry   Functional Outcome Score 40% 20/50 (21)       Insurance: Primary: Payor: Jalyn Ochoa /  /  / ,   Secondary:    Authorization Information: No precertification, no visit limit, aquatic covered, modalities covered, telehealth covered. Visit # 4, 4/10 for progress note   Visits Allowed: Based on medical necessity    Recertification Date:    Physician Follow-Up:    Physician Orders: PT 3x per week for 6 weeks evaluate and treat. See above for diagnosis. History of Present Illness: Patient has had low back pain >10 years. Follows with chiropractor 1x per month to manage pain and alignment. Patient reports of DDD at lumbar spine. Has trochanteric bursits in both hips within last 6 months to 1 year. SUBJECTIVE: Patient reports that she slipped down steps and landed on her right elbow and hit back side on stair post.  Having a little more pain today.  Pain level at left hip 5/10, back pain not so bad except where Prior HEP      []  Patient verbalized and/or demonstrated understanding of education provided. []  Patient unable to verbalize and/or demonstrate understanding of education provided. Will continue education. [x]  Barriers to learning: none    PLAN:  Treatment Recommendations: Strengthening, Range of Motion, Balance Training, Functional Mobility Training, Transfer Training, Neuromuscular Re-education, Manual Therapy - Soft Tissue Mobilization, Pain Management, Home Exercise Program, Patient Education, Aquatics and Modalities    []  Plan of care initiated. Plan to see patient 2-3 times per week for 8 weeks to address the treatment planned outlined above.   [x]  Continue with current plan of care  []  Modify plan of care as follows:    []  Hold pending physician visit  []  Discharge    Time In 1600   Time Out 1645   Timed Code Minutes: 45   Total Treatment Time: 45       Electronically Signed by: Karli Marin PT

## 2021-08-16 ENCOUNTER — HOSPITAL ENCOUNTER (OUTPATIENT)
Dept: PHYSICAL THERAPY | Age: 66
Setting detail: THERAPIES SERIES
Discharge: HOME | End: 2021-08-16
Payer: COMMERCIAL

## 2021-08-16 PROCEDURE — 97110 THERAPEUTIC EXERCISES: CPT

## 2021-08-16 NOTE — PROGRESS NOTES
[address] [facility]  PHYSICAL THERAPY  [] EVALUATION  [x] DAILY NOTE (LAND) [] DAILY NOTE (AQUATIC ) [] PROGRESS NOTE [] DISCHARGE NOTE    [x] OUTPATIENT REHABILITATION [facility]   [] [place] [address]    [] [facility]   [] Amparo Stark    Date: 2021  Patient Name:  Opal Vicente  : 1955  MRN: [id number]  CSN: [id number]    Referring Practitioner Daly Neely PA-C   Diagnosis Trochanteric bursitis, unspecified hip [M70.60]  Sacrococcygeal disorders, not elsewhere classified [M53.3]  Spondylosis without myelopathy or radiculopathy, lumbosacral region [M47.817]    Treatment Diagnosis Bilateral hip pain, lumbosacral pain with limited functional mobility with walking, transfers, unable to lie on left/right side. Date of Evaluation 21    Additional Pertinent History Right THR 2018, left THR ,  incontinence, obesity, arthritis. Jailyn Erlinda off bike 2021 right knee pain      Functional Outcome Measure Used Modified Oswestry   Functional Outcome Score 40% 20/50 (21)       Insurance: Primary: Payor: [employer] [employer] /  /  / ,   Secondary:    Authorization Information: No precertification, no visit limit, aquatic covered, modalities covered, telehealth covered. Visit # 5, 5/10 for progress note   Visits Allowed: Based on medical necessity    Recertification Date:    Physician Follow-Up:    Physician Orders: PT 3x per week for 6 weeks evaluate and treat. See above for diagnosis. History of Present Illness: Patient has had low back pain >10 years. Follows with chiropractor 1x per month to manage pain and alignment. Patient reports of DDD at lumbar spine. Has trochanteric bursits in both hips within last 6 months to 1 year. SUBJECTIVE: Patient feels she is making steady progress since initiating PT. TREATMENT   Precautions: History of left/right THR. NO US.     Pain: 3/10 lumbosacral 5/10 bilateral hips L >R    X in shaded column indicates activity completed today   Modalities Parameters/  Location  Notes                     Manual Therapy Time/Technique  Notes   Manual therapy soft tissue mobilization bilateral IT band region B along with B SI area 15 min  Patient prone with 1 pillow under chest               Exercise/Intervention   Notes   Abdominal bracing  5 sec 10x  Cues for breathing    Pelvic tilt 5 sec 10x x Cues for breathing and technique    Pelvic tilt with march, bent knee fall out B and U with band 10x Green band x Added green band for all exercises    Lower trunk rolls  5 sec 5x     Bridges with posterior pelvic tilt and band around outer thighs x10  Neon green band x    Supine HS stretch 15 sec 3x  Hold towel behind knee   hooklying UE reciprocal. Bilateral  10x, 10x       Sidelying: clamshells with green neon band 10x  x                                Seated posterior pelvic tilts  x10  x    LAQs  x10  x    Leg curls with neon green band x10  x    Seated marches x10  x                         Standing bilateral heelraises x10  x    Standing mini squats neon green band around outer thighs x10  x    Tandem stance r/l, l/r  15 count  x    Side stepping and monster walk  x2 laps  appx 30 ft X Green band above knee                         NuStep S=11, A=11 6 minutes  L3 X    hydrohip       TG squats       hydrostick               instuction in use of wedge seat cushion to decrease stress on bilateral hip pain         Specific Interventions Next Treatment: Manual therapy soft tissue mobilization bilateral IT band region right/left, core control/strengthening, hip strengthening for muscle imbalance, balance retraining/proprioception.      Activity/Treatment Tolerance:  [x]  Patient tolerated treatment well  []  Patient limited by fatigue  []  Patient limited by pain   []  Patient limited by medical complications  []  Other:     Assessment: Continued progressing patient in seated unsupported positions and standing to challenge core stability. Patient tolerates well and demonstrates excellent ability to maintain pelvic positioning with dynamic challenges. GOALS:  Patient Goal: Less pain at hips and back. Be able to lie on right/left sides. Walk >10 minutes    Short Term Goals:  Time Frame: 4 weeks  1. Patient to report of decreased pain symptoms at lumbosacral region with pain levels from 2/10 to 0/10 with walking, bending forward, lifting and vacuuming. 2. Patient to report of decreased bilateral hip pain with improved strength at hip musculature with decreased strain at hips at abductors with pain level at hips from 6/10 to 3/10. 3. Patient to demonstrate increased core control with increased strength at abdominal and trunk muscles with ability to complete ex in optimal lumbar neutral position for a series of exercises at 15 reps each in supine in hooklying, seated and standing positions. 4. Patient to demonstrate increased bilateral hip strength at hip abductors 4-/5 to 4/5, hip flexors 4-/5 to 4/5, hip extension 3+/5 to 4-/5 to 4/5 with improved muscle balance at hip and trunk muscles and progression in ex noted. 5. Note increased balance in SLS 5-8 seconds to 10-12 seconds with improved balance through LLE and RLE. Tandem stance from 30 seconds to 45 seconds. Long Term Goals:  Time Frame: 8 weeks  1. Oswestry 40% to 20%. 2. Patient independent in HEP with follow through in order to increase strength core control, hip strength to correct muscle imbalances, decrease pain at bilateral hips. Patient Education:   [x]  HEP/Education Completed: resisted marches, side stepping, monster walk     [employer] Access Code: [email or web address]  []  No new Education completed  [x]  Reviewed Prior HEP      []  Patient verbalized and/or demonstrated understanding of education provided. []  Patient unable to verbalize and/or demonstrate understanding of education provided. Will continue education.   [x]  Barriers to learning: none    PLAN:  Treatment Recommendations: Strengthening, Range of Motion, Balance Training, Functional Mobility Training, Transfer Training, Neuromuscular Re-education, Manual Therapy - Soft Tissue Mobilization, Pain Management, Home Exercise Program, Patient Education, Aquatics and Modalities    []  Plan of care initiated. Plan to see patient 2-3 times per week for 8 weeks to address the treatment planned outlined above.   [x]  Continue with current plan of care  []  Modify plan of care as follows:    []  Hold pending physician visit  []  Discharge    Time In 1545   Time Out 1625   Timed Code Minutes: 40   Total Treatment Time: 40       Electronically Signed by: Sharon Foreman PTA

## 2021-08-20 ENCOUNTER — HOSPITAL ENCOUNTER (OUTPATIENT)
Dept: PHYSICAL THERAPY | Age: 66
Setting detail: THERAPIES SERIES
Discharge: HOME | End: 2021-08-20
Payer: COMMERCIAL

## 2021-08-20 PROCEDURE — 97110 THERAPEUTIC EXERCISES: CPT

## 2021-08-20 NOTE — PROGRESS NOTES
[address] [facility]  PHYSICAL THERAPY  [] EVALUATION  [x] DAILY NOTE (LAND) [] DAILY NOTE (AQUATIC ) [] PROGRESS NOTE [] DISCHARGE NOTE    [x] OUTPATIENT REHABILITATION [facility]   [] [place] [address]    [] [facility]   [] [facility]    Date: 2021  Patient Name:  Sarah Lennon  : 1955  MRN: [id number]  CSN: [id number]    Referring Practitioner Chandra Reyes PA-C   Diagnosis Trochanteric bursitis, unspecified hip [M70.60]  Sacrococcygeal disorders, not elsewhere classified [M53.3]  Spondylosis without myelopathy or radiculopathy, lumbosacral region [M47.817]    Treatment Diagnosis Bilateral hip pain, lumbosacral pain with limited functional mobility with walking, transfers, unable to lie on left/right side. Date of Evaluation 21    Additional Pertinent History Right THR 2018, left THR ,  incontinence, obesity, arthritis. [place] off bike 2021 right knee pain      Functional Outcome Measure Used Modified Oswestry   Functional Outcome Score 40% 20/50 (21)       Insurance: Primary: Payor: Hakeem Loredo 150 /  /  / ,   Secondary:    Authorization Information: No precertification, no visit limit, aquatic covered, modalities covered, telehealth covered. Visit # 6, 6/10  for progress note   Visits Allowed: Based on medical necessity    Recertification Date:    Physician Follow-Up:    Physician Orders: PT 3x per week for 6 weeks evaluate and treat. See above for diagnosis. History of Present Illness: Patient has had low back pain >10 years. Follows with chiropractor 1x per month to manage pain and alignment. Patient reports of DDD at lumbar spine. Has trochanteric bursits in both hips within last 6 months to 1 year. SUBJECTIVE: Patient reports that she tolerated sitting at work better. She is able to sit 2 hours at a time without increased hip and back pain. Pain level 2/10 at back and hips.       TREATMENT   Precautions: History of left/right THR. NO US. Pain: 2/10 lumbosacral 2/10 bilateral hips L >R    X in shaded column indicates activity completed today   Modalities Parameters/  Location  Notes                     Manual Therapy Time/Technique  Notes   Manual therapy soft tissue mobilization bilateral IT band region B along with B SI area 15 min  Patient prone with 1 pillow under chest               Exercise/Intervention   Notes   Abdominal bracing  5 sec 10x  Cues for breathing    Pelvic tilt 5 sec 15x x Cues for breathing and technique    Pelvic tilt with march, bent knee fall out B and U with band 10x Green band x Added green band for all exercises    Lower trunk rolls  5 sec 5x     Bridges with posterior pelvic tilt and band around outer thighs x10  Neon green band x    Supine HS stretch 15 sec 3x x Hold towel behind knee   hooklying UE reciprocal. Bilateral  10x, 10x   x    Sidelying: clamshells with green neon band 10x  x                                Seated posterior pelvic tilts  x10      LAQs  x10      Leg curls with neon green band x10      Seated marches x10                    2 way hip with hip flexion, hip abduction  x5 each r/l  x HOLD on hip abduction due to pain at hip with this ex increased to 4/10. Patient told therapist after the ex.     Standing bilateral heelraises x15  x    Standing mini squats neon green band around outer thighs x10  x    Tandem stance r/l, l/r  15 count-20 count  x    Side stepping and monster walk  x2 laps  appx 30 ft x Green band above knee                         NuStep S=11, A=11 5 minutes  L3 X    hydrohip       TG squats       hydrostick fwd/back, diagonals right/left  x10  x           instuction in use of wedge seat cushion to decrease stress on bilateral hip pain         Specific Interventions Next Treatment: Manual therapy soft tissue mobilization bilateral IT band region right/left, core control/strengthening, hip strengthening for muscle imbalance, balance retraining/proprioception. Activity/Treatment Tolerance:  [x]  Patient tolerated treatment well  []  Patient limited by fatigue  []  Patient limited by pain   []  Patient limited by medical complications  []  Other:     Assessment:  Patient progressing well in PT with increased reps with dynamic stabilization exercises. Denies of any back pain in session. Hip pain laying down 0/10, Slight increased hip pain with standing ex to 4/10 but decreased by end of session to 3/10. Patient progressed to hydrostick today with good follow through with verbal cues. GOALS:  Patient Goal: Less pain at hips and back. Be able to lie on right/left sides. Walk >10 minutes    Short Term Goals:  Time Frame: 4 weeks  1. Patient to report of decreased pain symptoms at lumbosacral region with pain levels from 2/10 to 0/10 with walking, bending forward, lifting and vacuuming. 2. Patient to report of decreased bilateral hip pain with improved strength at hip musculature with decreased strain at hips at abductors with pain level at hips from 6/10 to 3/10. 3. Patient to demonstrate increased core control with increased strength at abdominal and trunk muscles with ability to complete ex in optimal lumbar neutral position for a series of exercises at 15 reps each in supine in hooklying, seated and standing positions. 4. Patient to demonstrate increased bilateral hip strength at hip abductors 4-/5 to 4/5, hip flexors 4-/5 to 4/5, hip extension 3+/5 to 4-/5 to 4/5 with improved muscle balance at hip and trunk muscles and progression in ex noted. 5. Note increased balance in SLS 5-8 seconds to 10-12 seconds with improved balance through LLE and RLE. Tandem stance from 30 seconds to 45 seconds. Long Term Goals:  Time Frame: 8 weeks  1. Oswestry 40% to 20%. 2. Patient independent in HEP with follow through in order to increase strength core control, hip strength to correct muscle imbalances, decrease pain at bilateral hips.

## 2021-08-23 ENCOUNTER — HOSPITAL ENCOUNTER (OUTPATIENT)
Dept: PHYSICAL THERAPY | Age: 66
Setting detail: THERAPIES SERIES
Discharge: HOME | End: 2021-08-23
Payer: COMMERCIAL

## 2021-08-23 PROCEDURE — 97110 THERAPEUTIC EXERCISES: CPT

## 2021-08-27 ENCOUNTER — HOSPITAL ENCOUNTER (OUTPATIENT)
Dept: PHYSICAL THERAPY | Age: 66
Setting detail: THERAPIES SERIES
Discharge: HOME | End: 2021-08-27
Payer: COMMERCIAL

## 2021-08-27 PROCEDURE — 97164 PT RE-EVAL EST PLAN CARE: CPT

## 2021-08-27 NOTE — PROGRESS NOTES
[address] [facility]  PHYSICAL THERAPY  [] EVALUATION  [] DAILY NOTE (LAND) [] DAILY NOTE (AQUATIC ) [x] PROGRESS NOTE [] DISCHARGE NOTE    [x] OUTPATIENT REHABILITATION [facility]   [] [place] [address]    [] [facility]   [] [facility]    Date: 2021  Patient Name:  Alida Councilman  : 1955  MRN: [id number]  CSN: [id number]    Referring Practitioner Don Barrera PA-C   Diagnosis Trochanteric bursitis, unspecified hip [M70.60]  Sacrococcygeal disorders, not elsewhere classified [M53.3]  Spondylosis without myelopathy or radiculopathy, lumbosacral region [M47.817]    Treatment Diagnosis Bilateral hip pain, lumbosacral pain with limited functional mobility with walking, transfers, unable to lie on left/right side. Date of Evaluation 21    Additional Pertinent History Right THR 2018, left THR ,  incontinence, obesity, arthritis. [place] off bike 2021 right knee pain      Functional Outcome Measure Used Modified Oswestry   Functional Outcome Score 40% 20/50 (21)   34% 17/50 (21) PN      Insurance: Primary: Payor: Hakeem Loredo 150 /  /  / ,   Secondary:    Authorization Information: No precertification, no visit limit, aquatic covered, modalities covered, telehealth covered. Visit # 8, 0/8  for progress note   Visits Allowed: Based on medical necessity    Recertification Date:    Physician Follow-Up:    Physician Orders: PT 3x per week for 6 weeks evaluate and treat. See above for diagnosis. History of Present Illness: Patient has had low back pain >10 years. Follows with chiropractor 1x per month to manage pain and alignment. Patient reports of DDD at lumbar spine. Has trochanteric bursits in both hips within last 6 months to 1 year. SUBJECTIVE:  Patient feels PT has been helping. Notes that she has next to no pain at right hip. Also has improved with less pain at left hip.  Discomfort more located at left low back (lumbosacral region) and buttock. Feels her leg strength and balance are better as well. Had an appointment with pain management yesterday and to keep treatment plan going. Continue PT for a few more weeks. TREATMENT   Precautions: History of left/right THR. NO US. Pain: 0/10 lumbosacral 5/10 Left hip    X in shaded column indicates activity completed today   Modalities Parameters/  Location  Notes   REASSESSMENT   x Refer to goal summary for status. See goal section               Manual Therapy Time/Technique  Notes   Manual therapy soft tissue mobilization bilateral IT band region B along with B SI area 15 min  Patient prone with 1 pillow under chest               Exercise/Intervention   Notes   Abdominal bracing  5 sec 10x  Cues for breathing    Pelvic tilt 5 sec 15x  Cues for breathing and technique    Pelvic tilt with march, bent knee fall out B and U with band 15x Green band     Lower trunk rolls  5 sec 5x     Bridges with posterior pelvic tilt and Green band around outer thighs 10x  3-5 seconds     Supine HS stretch 15 sec 3x  Hold towel behind knee   Hooklying UE reciprocal, Bilateral  10x, 10x       Sidelying: clamshells with green neon band 10x                    Seated posterior pelvic tilts  x10      LAQs  x10      Leg curls with neon green band x10      Seated marches x10                    2 way hip with hip flexion, hip abduction  x5 each r/l   HOLD on hip abduction due to pain at hip with this ex increased to 4/10. Patient told therapist after the ex.     Standing bilateral heelraises x15      Standing mini squats neon green band around outer thighs x10      Tandem stance r/l, l/r   30 seconds     Side stepping and monster walk  x2 laps  appx 30 ft  Green band above knee                         NuStep S=11, A=11 5 minutes  L3     Hydrohip bilateral and unilateral 10x each Level 3     TG squats       Hydrostick fwd/back, diagonals right/left  x10             instuction in use of wedge seat cushion to decrease stress on bilateral hip pain         Specific Interventions Next Treatment: Manual therapy soft tissue mobilization bilateral IT band region right/left, core control/strengthening, hip strengthening for muscle imbalance, balance retraining/proprioception. Activity/Treatment Tolerance:  [x]  Patient tolerated treatment well  []  Patient limited by fatigue  []  Patient limited by pain   []  Patient limited by medical complications  []  Other:     Assessment: Reassessment today. Refer to goal summary. Patient demonstrating great progress toward goals. Hip pain is less, increased hip strenght, improved core control with good progression noted with exercises. Balance continues to need to be addressed but better than her initial evaluation. Functional strength with lunges and partial squats need to be addressed as well as further progression in dynamic standing, core control in this position. Will continue to follow but decrease frequency to 1x per week for next 4 weeks. GOALS:  Patient Goal: Less pain at hips and back. Be able to lie on right/left sides. Walk >10 minutes  Patient can lay on her right side now. Cannot lay on left side yet. Stand and walk with shopping up to 20-30 minutes. Short Term Goals:  Time Frame: 4 weeks  1. Patient to report of decreased pain symptoms at lumbosacral region with pain levels from 2/10 to 0/10 with walking, bending forward, lifting and vacuuming. GOAL NOT MET back pain remains 2-3/10 with walking, bending and vacuuming    2. Patient to report of decreased bilateral hip pain with improved strength at hip musculature with decreased strain at hips at abductors with pain level at hips from 6/10 to 3/10.   GOAL NOT MET Average hip pain level is between a 3-4/10.     3. Patient to demonstrate increased core control with increased strength at abdominal and trunk muscles with ability to complete ex in optimal lumbar neutral position for a series of exercises at 15 reps each in supine in hooklying, seated and standing positions. GOAL BEING MET Patient demonstrating great progress with ex program with progression 10-15 reps with hooklying ex, standing and seated ex. Continue goal with progression in exercises with increased reps . 4. Patient to demonstrate increased bilateral hip strength at hip abductors 4-/5 to 4/5, hip flexors 4-/5 to 4/5, hip extension 3+/5 to 4-/5 to 4/5 with improved muscle balance at hip and trunk muscles and progression in ex noted. GOAL MET      Hip flexors right 5/5, left 4/5, hip abduction 5/5, hip extension 4+/5 to 5/5.     5. Note increased balance in SLS 5-8 seconds to 10-12 seconds with improved balance through LLE and RLE. Tandem stance from 30 seconds to 45 seconds. GOAL NOT  MET for SLS RLE 15 seconds, SLS LLE 8 seconds. GOAL MET for TAndem stance 45 seconds. Long Term Goals:  Time Frame: 8 weeks  1. Oswestry 40% to 20%. ONGOING see STG  2. Patient independent in HEP with follow through in order to increase strength core control, hip strength to correct muscle imbalances, decrease pain at bilateral hips. ONGOING continue progressing HEP in sessions       Patient Education:   [x]  HEP/Education Completed: resisted marches, side stepping, monster walk     [employer] Access Code: [email or web address]  []  No new Education completed  [x]  Reviewed Prior HEP      []  Patient verbalized and/or demonstrated understanding of education provided. []  Patient unable to verbalize and/or demonstrate understanding of education provided. Will continue education. [x]  Barriers to learning: none    PLAN:  Treatment Recommendations: Strengthening, Range of Motion, Balance Training, Functional Mobility Training, Transfer Training, Neuromuscular Re-education, Manual Therapy - Soft Tissue Mobilization, Pain Management, Home Exercise Program, Patient Education, Aquatics and Modalities    []  Plan of care initiated.   Plan to see patient 2-3 times per week for 8 weeks to address the treatment planned outlined above. [x]  Continue with current plan of care  [x]  Modify plan of care as follows:  1x per week for 4 week-6 weeks.    []  Hold pending physician visit  []  Discharge    Time In 1520   Time Out 1555   Timed Code Minutes: 0   Total Treatment Time: 35       Electronically Signed by: Enma Cardenas PT

## 2021-09-09 ENCOUNTER — HOSPITAL ENCOUNTER (OUTPATIENT)
Dept: PHYSICAL THERAPY | Age: 66
Setting detail: THERAPIES SERIES
Discharge: HOME | End: 2021-09-09
Payer: COMMERCIAL

## 2021-09-09 PROCEDURE — 97110 THERAPEUTIC EXERCISES: CPT

## 2021-09-09 NOTE — PROGRESS NOTES
[address] [facility]  PHYSICAL THERAPY  [] EVALUATION  [x] DAILY NOTE (LAND) [] DAILY NOTE (AQUATIC ) [] PROGRESS NOTE [] DISCHARGE NOTE    [x] OUTPATIENT REHABILITATION CENTER [facility]   [] [place] [address]    [] [facility]   [] Elian Lockett    Date: 2021  Patient Name:  Danielle Vazquez  : 1955  MRN: [id number]  CSN: [id number]    Referring Practitioner Tiffany Rajan PA-C   Diagnosis Trochanteric bursitis, unspecified hip [M70.60]  Sacrococcygeal disorders, not elsewhere classified [M53.3]  Spondylosis without myelopathy or radiculopathy, lumbosacral region [M47.817]    Treatment Diagnosis Bilateral hip pain, lumbosacral pain with limited functional mobility with walking, transfers, unable to lie on left/right side. Date of Evaluation 21    Additional Pertinent History Right THR 2018, left THR ,  incontinence, obesity, arthritis. [place] off bike 2021 right knee pain      Functional Outcome Measure Used Modified Oswestry   Functional Outcome Score 40% 20/50 (21)   34% 17/50 (21) PN      Insurance: Primary: Payor: Paula Tsang /  /  / ,   Secondary:    Authorization Information: No precertification, no visit limit, aquatic covered, modalities covered, telehealth covered. Visit # 9,   for progress note   Visits Allowed: Based on medical necessity    Recertification Date:    Physician Follow-Up:    Physician Orders: PT 3x per week for 6 weeks evaluate and treat. See above for diagnosis. History of Present Illness: Patient has had low back pain >10 years. Follows with chiropractor 1x per month to manage pain and alignment. Patient reports of DDD at lumbar spine. Has trochanteric bursits in both hips within last 6 months to 1 year. SUBJECTIVE:  Pt feels therapy is going well with less pain noticed. Mild tenderness in L hip region continues but is alleviated when sitting.      TREATMENT   Precautions: History of left/right THR. NO US. Pain: 0/10 lumbosacral 5/10 Left hip with walking    X in shaded column indicates activity completed today   Modalities Parameters/  Location  Notes   REASSESSMENT    Refer to goal summary for status. See goal section               Manual Therapy Time/Technique  Notes   Manual therapy soft tissue mobilization bilateral IT band region B along with B SI area 15 min  Patient prone with 1 pillow under chest               Exercise/Intervention   Notes   Abdominal bracing  5 sec 10x  Cues for breathing    Pelvic tilt 5 sec 15x  Cues for breathing and technique    Pelvic tilt with march, bent knee fall out B and U with band 15x Green band     Lower trunk rolls  5 sec 5x     Bridges with posterior pelvic tilt and Green band around outer thighs 10x  3-5 seconds     Supine HS stretch 15 sec 3x  Hold towel behind knee   Hooklying UE reciprocal, Bilateral  10x, 10x       Sidelying: clamshells with green neon band 10x                    Seated posterior pelvic tilts  x10  x    LAQs  x10  x    Leg curls with neon green band x10  x    Seated marches x10                    2 way hip with hip flexion, hip abduction  x5 each r/l   HOLD on hip abduction due to pain at hip with this ex increased to 4/10. Patient told therapist after the ex.     Standing bilateral heelraises, marching x15  x    Standing mini squats neon green band around outer thighs x15  x    Tandem stance r/l, l/r   30 seconds x    Side stepping and monster walk  x2 laps  appx 30 ft x Green band above knee, 1 lap with monster walk   lunges 10x  x 1 UE support                 NuStep S=11, A=11 5 minutes  L5 x    Hydrohip bilateral and unilateral 15x each Level 3 x    TG squats 15x  x    Hydrostick fwd/back, diagonals right/left, CW,CCW x10  x           instuction in use of wedge seat cushion to decrease stress on bilateral hip pain         Specific Interventions Next Treatment: Manual therapy soft tissue mobilization bilateral IT band region right/left, core control/strengthening, hip strengthening for muscle imbalance, balance retraining/proprioception. Activity/Treatment Tolerance:  [x]  Patient tolerated treatment well  []  Patient limited by fatigue  []  Patient limited by pain   []  Patient limited by medical complications  []  Other:     Assessment:Good demonstrattion with core engagement with sitting exercises this date. Pt able to add in TG and lunges for hip strengthening with no increase in pain. GOALS:  Patient Goal: Less pain at hips and back. Be able to lie on right/left sides. Walk >10 minutes  Patient can lay on her right side now. Cannot lay on left side yet. Stand and walk with shopping up to 20-30 minutes. Short Term Goals:  Time Frame: 4 weeks  1. Patient to report of decreased pain symptoms at lumbosacral region with pain levels from 2/10 to 0/10 with walking, bending forward, lifting and vacuuming. GOAL NOT MET back pain remains 2-3/10 with walking, bending and vacuuming    2. Patient to report of decreased bilateral hip pain with improved strength at hip musculature with decreased strain at hips at abductors with pain level at hips from 6/10 to 3/10. GOAL NOT MET Average hip pain level is between a 3-4/10.     3. Patient to demonstrate increased core control with increased strength at abdominal and trunk muscles with ability to complete ex in optimal lumbar neutral position for a series of exercises at 15 reps each in supine in hooklying, seated and standing positions. GOAL BEING MET Patient demonstrating great progress with ex program with progression 10-15 reps with hooklying ex, standing and seated ex. Continue goal with progression in exercises with increased reps . 4. Patient to demonstrate increased bilateral hip strength at hip abductors 4-/5 to 4/5, hip flexors 4-/5 to 4/5, hip extension 3+/5 to 4-/5 to 4/5 with improved muscle balance at hip and trunk muscles and progression in ex noted.   GOAL MET      Hip flexors right 5/5, left 4/5, hip abduction 5/5, hip extension 4+/5 to 5/5.     5. Note increased balance in SLS 5-8 seconds to 10-12 seconds with improved balance through LLE and RLE. Tandem stance from 30 seconds to 45 seconds. GOAL NOT  MET for SLS RLE 15 seconds, SLS LLE 8 seconds. GOAL MET for TAndem stance 45 seconds. Long Term Goals:  Time Frame: 8 weeks  1. Oswestry 40% to 20%. ONGOING see STG  2. Patient independent in HEP with follow through in order to increase strength core control, hip strength to correct muscle imbalances, decrease pain at bilateral hips. ONGOING continue progressing HEP in sessions       Patient Education:   []  HEP/Education Completed: resisted marches, side stepping, monster walk     Known Access Code: [email or web address]  [x]  No new Education completed  []  Reviewed Prior HEP      []  Patient verbalized and/or demonstrated understanding of education provided. []  Patient unable to verbalize and/or demonstrate understanding of education provided. Will continue education. [x]  Barriers to learning: none    PLAN:  Treatment Recommendations: Strengthening, Range of Motion, Balance Training, Functional Mobility Training, Transfer Training, Neuromuscular Re-education, Manual Therapy - Soft Tissue Mobilization, Pain Management, Home Exercise Program, Patient Education, Aquatics and Modalities    []  Plan of care initiated. Plan to see patient 2-3 times per week for 8 weeks to address the treatment planned outlined above. [x]  Continue with current plan of care  [x]  Modify plan of care as follows:  1x per week for 4 week-6 weeks.    []  Hold pending physician visit  []  Discharge    Time In 1552   Time Out 1630   Timed Code Minutes: 38   Total Treatment Time: 45       Electronically Signed by: Gladys Weaver PTA

## 2021-09-17 ENCOUNTER — APPOINTMENT (OUTPATIENT)
Dept: PHYSICAL THERAPY | Age: 66
End: 2021-09-17
Payer: COMMERCIAL

## 2021-09-24 ENCOUNTER — HOSPITAL ENCOUNTER (OUTPATIENT)
Dept: PHYSICAL THERAPY | Age: 66
Setting detail: THERAPIES SERIES
Discharge: HOME | End: 2021-09-24
Payer: COMMERCIAL

## 2021-09-24 PROCEDURE — 97110 THERAPEUTIC EXERCISES: CPT

## 2021-09-24 NOTE — PROGRESS NOTES
[address] [facility]  PHYSICAL THERAPY  [] EVALUATION  [x] DAILY NOTE (LAND) [] DAILY NOTE (AQUATIC ) [] PROGRESS NOTE [] DISCHARGE NOTE    [x] OUTPATIENT REHABILITATION [facility]   [] [place] [address]    [] [facility]   [] Michelle Vernon    Date: 2021  Patient Name:  Yusuf Damon  : 1955  MRN: [id number]  CSN: [id number]    Referring Practitioner Tianna Resendiz PA-C   Diagnosis Trochanteric bursitis, unspecified hip [M70.60]  Sacrococcygeal disorders, not elsewhere classified [M53.3]  Spondylosis without myelopathy or radiculopathy, lumbosacral region [M47.817]    Treatment Diagnosis Bilateral hip pain, lumbosacral pain with limited functional mobility with walking, transfers, unable to lie on left/right side. Date of Evaluation 21    Additional Pertinent History Right THR 2018, left THR ,  incontinence, obesity, arthritis. Meggangonzalez Francisco Javiercb off bike 2021 right knee pain      Functional Outcome Measure Used Modified Oswestry   Functional Outcome Score 40% 20/50 (21)   34% 17/50 (21) PN      Insurance: Primary: Payor: Shirley Treviño /  /  / ,   Secondary:    Authorization Information: No precertification, no visit limit, aquatic covered, modalities covered, telehealth covered. Visit # 10,   for progress note   Visits Allowed: Based on medical necessity    Recertification Date:    Physician Follow-Up:    Physician Orders: PT 3x per week for 6 weeks evaluate and treat. See above for diagnosis. History of Present Illness: Patient has had low back pain >10 years. Follows with chiropractor 1x per month to manage pain and alignment. Patient reports of DDD at lumbar spine. Has trochanteric bursits in both hips within last 6 months to 1 year. SUBJECTIVE:  Patient states the Right hip hasn't really bothered her at all since starting therapy but the Left hip is still giving her some trouble. Lower back is a little sore today.  States she is doing her HEP every other day and it seems to be going well. TREATMENT   Precautions: History of left/right THR. NO US. Pain: 4/10 lumbosacral 1/10 Left hip    X in shaded column indicates activity completed today   Modalities Parameters/  Location  Notes   REASSESSMENT    Refer to goal summary for status. See goal section               Manual Therapy Time/Technique  Notes   Manual therapy soft tissue mobilization bilateral IT band region B along with B SI area 15 min  Patient prone with 1 pillow under chest               Exercise/Intervention   Notes   Abdominal bracing  5 sec 10x  Cues for breathing    Pelvic tilt 5 sec 15x  Cues for breathing and technique    Pelvic tilt with march, bent knee fall out B and U with band 15x Green band     Lower trunk rolls  5 sec 5x     Bridges with posterior pelvic tilt and Green band around outer thighs 10x  3-5 seconds     Supine HS stretch 15 sec 3x  Hold towel behind knee   Hooklying UE reciprocal, Bilateral  10x, 10x       Sidelying: clamshells with green neon band 10x                    Seated posterior pelvic tilts  x15 5 seconds X    LAQs  x15 B 5 seconds X    Leg curls with neon green band x15 B  X    Seated marches x10                    2 way hip with hip flexion, hip abduction  x5 each r/l   HOLD on hip abduction due to pain at hip with this ex increased to 4/10. Patient told therapist after the ex.     Standing bilateral heelraises, marching x20  X    Standing mini squats neon green band around outer thighs x20  X    Tandem stance   1 minute X    Side stepping and monster walk  x2 laps  appx 30 ft X Green band above knee, 1 lap with monster walk   Lunges 10x  X 1 UE support   Rockerboard forward/back, side to side  15x 30 second balance X           NuStep (Seat 11, Arms 11) 5 minutes  Level 5 X    Hydrohip bilateral and unilateral 20x each Level 3 X    Total Gym squats 20x  X    Hydrostick fwd/back, diagonals right/left, CW,CCW x10  X instuction in use of wedge seat cushion to decrease stress on bilateral hip pain         Specific Interventions Next Treatment: Manual therapy soft tissue mobilization bilateral IT band region right/left, core control/strengthening, hip strengthening for muscle imbalance, balance retraining/proprioception. Activity/Treatment Tolerance:  [x]  Patient tolerated treatment well  []  Patient limited by fatigue  []  Patient limited by pain   []  Patient limited by medical complications  []  Other:     Assessment:  Patient demonstrated good posture and core engagement. Progressed balance and proprioception retraining with the addition of rockerboard today. Patient tolerated activities well and denies increased pain. GOALS:  Patient Goal: Less pain at hips and back. Be able to lie on right/left sides. Walk >10 minutes  Patient can lay on her right side now. Cannot lay on left side yet. Stand and walk with shopping up to 20-30 minutes. Short Term Goals:  Time Frame: 4 weeks  1. Patient to report of decreased pain symptoms at lumbosacral region with pain levels from 2/10 to 0/10 with walking, bending forward, lifting and vacuuming. 2. Patient to report of decreased bilateral hip pain with improved strength at hip musculature with decreased strain at hips at abductors with pain level at hips from 6/10 to 3/10. 3. Patient to demonstrate increased core control with increased strength at abdominal and trunk muscles with ability to complete ex in optimal lumbar neutral position for a series of exercises at 15 reps each in supine in hooklying, seated and standing positions. 4. Note increased balance in SLS 5-8 seconds to 10-12 seconds with improved balance through LLE and RLE. Tandem stance from 30 seconds to 45 seconds. Long Term Goals:  Time Frame: 8 weeks  1. Oswestry 40% to 20%.     2. Patient independent in [facility] with follow through in order to increase strength core control, hip strength to correct muscle imbalances, decrease pain at bilateral hips. Patient Education:   [x]  HEP/Education Completed: Rockerboard. Importance of exercises daily     [employer] Access Code: [email or web address]  []  No new Education completed  []  Reviewed Prior HEP      []  Patient verbalized and/or demonstrated understanding of education provided. []  Patient unable to verbalize and/or demonstrate understanding of education provided. Will continue education. []  Barriers to learning: none    PLAN:  Treatment Recommendations: Strengthening, Range of Motion, Balance Training, Functional Mobility Training, Transfer Training, Neuromuscular Re-education, Manual Therapy - Soft Tissue Mobilization, Pain Management, Home Exercise Program, Patient Education, Aquatics and Modalities    []  Plan of care initiated. Plan to see patient 2-3 times per week for 8 weeks to address the treatment planned outlined above. [x]  Continue with current plan of care  []  Modify plan of care as follows:  1x per week for 4 week-6 weeks.    []  Hold pending physician visit  []  Discharge    Time In 1515   Time Out 1555   Timed Code Minutes: 40 min   Total Treatment Time: 40 min       Electronically Signed by: Georgina Britt PTA

## 2021-09-28 ENCOUNTER — OFFICE VISIT (OUTPATIENT)
Dept: UROLOGY | Age: 66
End: 2021-09-28
Payer: COMMERCIAL

## 2021-09-28 VITALS
WEIGHT: 241.5 LBS | SYSTOLIC BLOOD PRESSURE: 138 MMHG | BODY MASS INDEX: 37.9 KG/M2 | DIASTOLIC BLOOD PRESSURE: 74 MMHG | HEIGHT: 67 IN

## 2021-09-28 DIAGNOSIS — R32 URINARY INCONTINENCE, UNSPECIFIED TYPE: Primary | ICD-10-CM

## 2021-09-28 LAB
BACTERIA: ABNORMAL
BILIRUBIN URINE: NEGATIVE
BILIRUBIN URINE: NEGATIVE
BLOOD URINE, POC: ABNORMAL
BLOOD, URINE: NEGATIVE
CASTS: ABNORMAL /LPF
CASTS: ABNORMAL /LPF
CHARACTER, URINE: CLEAR
CHARACTER, URINE: CLEAR
COLOR, URINE: YELLOW
COLOR: YELLOW
CRYSTALS: ABNORMAL
EPITHELIAL CELLS, UA: ABNORMAL /HPF
GLUCOSE URINE: NEGATIVE MG/DL
GLUCOSE, URINE: NEGATIVE MG/DL
KETONES, URINE: ABNORMAL
KETONES, URINE: NEGATIVE
LEUKOCYTE CLUMPS, URINE: ABNORMAL
LEUKOCYTE ESTERASE, URINE: ABNORMAL
MISCELLANEOUS LAB TEST RESULT: ABNORMAL
NITRITE, URINE: NEGATIVE
NITRITE, URINE: NEGATIVE
PH UA: 5.5 (ref 5–9)
PH, URINE: 5.5 (ref 5–9)
POST VOID RESIDUAL (PVR): 0 ML
PROTEIN UA: NEGATIVE MG/DL
PROTEIN, URINE: NEGATIVE MG/DL
RBC URINE: ABNORMAL /HPF
RENAL EPITHELIAL, UA: ABNORMAL
SPECIFIC GRAVITY UA: 1.02 (ref 1–1.03)
SPECIFIC GRAVITY, URINE: 1.02 (ref 1–1.03)
UROBILINOGEN, URINE: 0.2 EU/DL (ref 0–1)
UROBILINOGEN, URINE: 0.2 EU/DL (ref 0–1)
WBC UA: ABNORMAL /HPF
YEAST: ABNORMAL

## 2021-09-28 PROCEDURE — 51798 US URINE CAPACITY MEASURE: CPT | Performed by: NURSE PRACTITIONER

## 2021-09-28 PROCEDURE — 81003 URINALYSIS AUTO W/O SCOPE: CPT | Performed by: NURSE PRACTITIONER

## 2021-09-28 PROCEDURE — 99214 OFFICE O/P EST MOD 30 MIN: CPT | Performed by: NURSE PRACTITIONER

## 2021-09-28 RX ORDER — SOLIFENACIN SUCCINATE 5 MG/1
5 TABLET, FILM COATED ORAL DAILY
Qty: 30 TABLET | Refills: 2 | Status: SHIPPED | OUTPATIENT
Start: 2021-09-28 | End: 2021-11-05

## 2021-09-28 NOTE — PROGRESS NOTES
[address] [facility].  [address]  [facility] [address]  Dept: [phone]  Loc: [phone]    Visit Date: 9/28/2021        HPI:     Opal iVcente is a 77 y.o. female who presents today for:  Chief Complaint   Patient presents with    Incontinence    Follow-up       HPI   Pt seen in follow up for incontinence. Jose has a hx of urinary incontinence spanning years that worsened in the last 12 months. Notes episodes of complete incontinence. Not significant frequency or urgency. Notes \"it just happens and I don't really have control\". Was started on oxybutynin 10 mg XL daily with improvement in symptoms but notes it causes her mouth to be dry and she gets an \"odd taste in her mouth\" from the medication. Is using usually 1 pad per day and 1-2 per night. Current Outpatient Medications   Medication Sig Dispense Refill    oxybutynin (DITROPAN XL) 10 MG extended release tablet Take 1 tablet by mouth daily 30 tablet 2    gabapentin (NEURONTIN) 600 MG tablet Take 600 mg by mouth 3 times daily.  Ascorbic Acid (VITAMIN C PO) Take by mouth      ZINC PO Take by mouth daily      VITAMIN A PO Take by mouth daily      vitamin D (CHOLECALCIFEROL) 5000 UNITS CAPS capsule Take 7,500 Units by mouth daily Alternate between 5000 and 41281      TraMADol HCl  MG CP24 Take 100 mg by mouth 3 times daily.  atorvastatin (LIPITOR) 40 MG tablet Take 40 mg by mouth daily.  warfarin (COUMADIN) 5 MG tablet Take by mouth Fluctuates due to factor 5, controlled by Dr. Marybel Ortega       No current facility-administered medications for this visit. Past Medical History  Brittany Rose  has a past medical history of Arthritis, Factor V deficiency ([facility] [address] Hx of blood clots, Hyperlipidemia, and Hypothyroidism due to acquired atrophy of thyroid. Past Surgical History  The patient  has a past surgical history that includes Toe Surgery (2004? ?);  Tubal ligation (1996); Colonoscopy; Foot surgery (Left, 09/24/14); and joint replacement (2009/2018). Family History  This patient's family history includes COPD in her father; Cancer in her father and paternal grandmother; Heart Attack in her paternal grandfather; Heart Disease in her paternal grandfather; High Blood Pressure in her father and mother; High Cholesterol in her mother; Other in her maternal grandmother, mother, and sister; Stroke in her maternal grandfather. Social History  Ruba Tirado  reports that she has never smoked. She has never used smokeless tobacco. She reports current alcohol use. She reports that she does not use drugs. Subjective:      Review of Systems    Objective:   /74   Ht 5' 7\" (1.702 m)   Wt 241 lb 8 oz (109.5 kg)   BMI 37.82 kg/m²     Physical Exam    POC  Results for POC orders placed in visit on 09/28/21   POCT Urinalysis No Micro (Auto)   Result Value Ref Range    Glucose, Ur Negative NEGATIVE mg/dl    Bilirubin Urine Negative     Ketones, Urine Negative NEGATIVE    Specific Gravity, Urine 1.025 1.002 - 1.030    Blood, UA POC Trace-lysed NEGATIVE    pH, Urine 5.50 5.0 - 9.0    Protein, Urine Negative NEGATIVE mg/dl    Urobilinogen, Urine 0.20 0.0 - 1.0 eu/dl    Nitrite, Urine Negative NEGATIVE    Leukocyte Clumps, Urine Small (A) NEGATIVE    Color, Urine Yellow YELLOW-STRAW    Character, Urine Clear [id number]   poct post void residual   Result Value Ref Range    post void residual 0 ml         Patients recent PSA values are as follows  No results found for: PSA, PSADIA     Recent BUN/Creatinine:  Lab Results   Component Value Date    BUN 10 08/12/2019    CREATININE 0.9 08/12/2019       Assessment:   Urinary incontinence  Hx of factor V lieden and blood clots on coumadin  Plan:     Stop Oxybutynin 10 mg XL daily and trial Vesicare 5 mg daily. F/u in 4-6 weeks with PVR.

## 2021-09-29 LAB
ORGANISM: ABNORMAL
URINE CULTURE, ROUTINE: ABNORMAL

## 2021-10-01 ENCOUNTER — HOSPITAL ENCOUNTER (OUTPATIENT)
Dept: PHYSICAL THERAPY | Age: 66
Setting detail: THERAPIES SERIES
Discharge: HOME | End: 2021-10-01
Payer: COMMERCIAL

## 2021-10-01 PROCEDURE — 97110 THERAPEUTIC EXERCISES: CPT

## 2021-10-01 NOTE — DISCHARGE SUMMARY
[address] [facility]  PHYSICAL THERAPY  [] EVALUATION  [] DAILY NOTE (LAND) [] DAILY NOTE (AQUATIC ) [] PROGRESS NOTE [x] DISCHARGE NOTE    [x] OUTPATIENT REHABILITATION [facility]   [] [place] [address]    [] [facility]   [] Zaida Ceballos    Date: 10/1/2021  Patient Name:  Monique Gonsales  : 1955  MRN: [id number]  CSN: [id number]    Referring Practitioner Carlitos Brice PA-C   Diagnosis Trochanteric bursitis, unspecified hip [M70.60]  Sacrococcygeal disorders, not elsewhere classified [M53.3]  Spondylosis without myelopathy or radiculopathy, lumbosacral region [M47.817]    Treatment Diagnosis Bilateral hip pain, lumbosacral pain with limited functional mobility with walking, transfers, unable to lie on left/right side. Date of Evaluation 21    Additional Pertinent History Right THR 2018, left THR ,  incontinence, obesity, arthritis. Zilphia Moat off bike 2021 right knee pain      Functional Outcome Measure Used Modified Oswestry   Functional Outcome Score 40% 20/50 (21)   34% 17/50 (21) PN  26% 13/50 (10/1/21) Discharge        Insurance: Primary: Payor: Sara Monroe /  /  / ,   Secondary:    Authorization Information: No precertification, no visit limit, aquatic covered, modalities covered, telehealth covered. Visit # 11, 3/3  for progress note 0/6 for next appointment   Visits Allowed: Based on medical necessity    Recertification Date:    Physician Follow-Up:    Physician Orders: PT 3x per week for 6 weeks evaluate and treat. See above for diagnosis. History of Present Illness: Patient has had low back pain >10 years. Follows with chiropractor 1x per month to manage pain and alignment. Patient reports of DDD at lumbar spine. Has trochanteric bursits in both hips within last 6 months to 1 year. SUBJECTIVE:  Patient reports that left hip does continue to bother her but it does not hurt all the time now.  Notes standing and walking bothers it the worse. Right hip is doing well and has made progress with 0/10 pain, little stronger. Feels ex and core strengthening have been helping. Patient does HEP 2x per week. Continues to take Gabapentin and Tramadol for pain prescribed by Alan Guzman, in Pain Management. TREATMENT   Precautions: History of left/right THR. NO US. Pain: 4/10 lumbosacral 1/10 Left hip    X in shaded column indicates activity completed today   Modalities Parameters/  Location  Notes   REASSESSMENT   x Refer to goal summary for status. See goal section               Manual Therapy Time/Technique  Notes   Manual therapy soft tissue mobilization bilateral IT band region B along with B SI area 15 min  Patient prone with 1 pillow under chest               Exercise/Intervention   Notes   Abdominal bracing  5 sec 10x  Cues for breathing    Pelvic tilt 5 sec 15x  Cues for breathing and technique    Pelvic tilt with march, bent knee fall out B and U with band 15x Green band     Lower trunk rolls  5 sec 5x     Bridges with posterior pelvic tilt and Green band around outer thighs 10x  3-5 seconds     Supine HS stretch 15 sec 3x  Hold towel behind knee   Hooklying UE reciprocal, Bilateral  10x, 10x       Sidelying: clamshells with green neon band 10x                    Seated posterior pelvic tilts  x15 5 seconds X    LAQs  x15 B 5 seconds X    Leg curls with neon green band x15 B  X    Seated marches x10                    2 way hip with hip flexion, hip abduction  x5 each r/l   HOLD on hip abduction due to pain at hip with this ex increased to 4/10. Patient told therapist after the ex.     Standing bilateral heelraises, marching x20  X    Standing mini squats neon green band around outer thighs x20  X    Tandem stance   1 minute X    Side stepping and monster walk  x2 laps  appx 30 ft X Green band above knee, 1 lap with monster walk   Lunges 10x   1 UE support   Rockerboard forward/back, side to side  15x 30 second balance            NuStep (Seat 11, Arms 11) 5 minutes  Level 5     Hydrohip bilateral and unilateral 20x each Level 3     Total Gym squats 20x      Hydrostick fwd/back, diagonals right/left, CW,CCW x10             instuction in use of wedge seat cushion to decrease stress on bilateral hip pain         Specific Interventions Next Treatment: Manual therapy soft tissue mobilization bilateral IT band region right/left, core control/strengthening, hip strengthening for muscle imbalance, balance retraining/proprioception. Activity/Treatment Tolerance:  [x]  Patient tolerated treatment well  []  Patient limited by fatigue  []  Patient limited by pain   []  Patient limited by medical complications  []  Other:     Assessment:  PN/Discharge note today. Patient has progressed in therapy with pain decreased to 0/10 at right hip and 3-5/10 pain left hip. BAck pain can range from 0-2/10 now. She has progressed with strengthening program with core control, LE strength. Patient able to ambulate for shopping with cart for about 1 hour now. Without cart left hip pain increases after 30 minutes. Her walking time has improved and no low back pain. Left hip pain has persisted. Will discharge with independence in HEP and goal progress with back and right hip. GOALS:  Patient Goal: Less pain at hips and back. Be able to lie on right/left sides. Walk >10 minutes  Patient can lay on her right side now. Cannot lay on left side yet. Stand and walk with shopping up to 20-30 minutes. Short Term Goals:  Time Frame: 4 weeks  1. Patient to report of decreased pain symptoms at lumbosacral region with pain levels from 2/10 to 0/10 with walking, bending forward, lifting and vacuuming. GOAL NOT MET back pain is less and averages 0-2/10 pain level with walking and bending forward.      2. Patient to report of decreased bilateral hip pain with improved strength at hip musculature with decreased strain at hips at abductors with pain level at hips from 6/10 to 3/10. GOAL MET with right hip 0/10. Which has improved significantly. Left hip can have pain between a 3-5/10     3. Patient to demonstrate increased core control with increased strength at abdominal and trunk muscles with ability to complete ex in optimal lumbar neutral position for a series of exercises at 15 reps each in supine in hooklying, seated and standing positions. GOAL MET Patient has progressed in core control, strengthening and progression with ex in clinic and HEP     4. Note increased balance in SLS 5-8 seconds to 10-12 seconds with improved balance through LLE and RLE. Tandem stance from 30 seconds to 45 seconds. GOAL MET SLS right/left 15-18 seconds, tandem stance >45 seconds. Long Term Goals:  Time Frame: 8 weeks  1. Oswestry 40% to 20%. GOAL NOT MET but did improve to 26%     2. Patient independent in HEP with follow through in order to increase strength core control, hip strength to correct muscle imbalances, decrease pain at bilateral hips. GOAL MET Patient independent with HEP with follow through with the exercises. Patient Education:   [x]  HEP/Education Completed: Continue HEP exercises daily  Issued revised ex to complete in standing for balance, strength, core control    [employer] Access Code: [email or web address]  []  No new Education completed  []  Reviewed Prior HEP      []  Patient verbalized and/or demonstrated understanding of education provided. []  Patient unable to verbalize and/or demonstrate understanding of education provided. Will continue education. []  Barriers to learning: none    PLAN:  Treatment Recommendations: Strengthening, Range of Motion, Balance Training, Functional Mobility Training, Transfer Training, Neuromuscular Re-education, Manual Therapy - Soft Tissue Mobilization, Pain Management, Home Exercise Program, Patient Education, Aquatics and Modalities    []  Plan of care initiated.   Plan to see patient 2-3 times per week for 8 weeks to address the treatment planned outlined above. [x]  Continue with current plan of care  []  Modify plan of care as follows:  1x per week for 4 week-6 weeks.    []  Hold pending physician visit  []  Discharge    Time In 1558   Time Out 1636   Timed Code Minutes: 38   Total Treatment Time: 38       Electronically Signed by: Reta Perdomo PT

## 2021-11-05 ENCOUNTER — OFFICE VISIT (OUTPATIENT)
Dept: UROLOGY | Age: 66
End: 2021-11-05
Payer: COMMERCIAL

## 2021-11-05 VITALS
WEIGHT: 242 LBS | HEIGHT: 67 IN | DIASTOLIC BLOOD PRESSURE: 78 MMHG | SYSTOLIC BLOOD PRESSURE: 138 MMHG | BODY MASS INDEX: 37.98 KG/M2

## 2021-11-05 DIAGNOSIS — R32 URINARY INCONTINENCE, UNSPECIFIED TYPE: Primary | ICD-10-CM

## 2021-11-05 LAB
BILIRUBIN URINE: NEGATIVE
BLOOD URINE, POC: NEGATIVE
CHARACTER, URINE: CLEAR
COLOR, URINE: YELLOW
GLUCOSE URINE: NEGATIVE MG/DL
KETONES, URINE: NEGATIVE
LEUKOCYTE CLUMPS, URINE: ABNORMAL
NITRITE, URINE: NEGATIVE
PH, URINE: 6 (ref 5–9)
POST VOID RESIDUAL (PVR): 0 ML
PROTEIN, URINE: NEGATIVE MG/DL
SPECIFIC GRAVITY, URINE: >= 1.03 (ref 1–1.03)
UROBILINOGEN, URINE: 1 EU/DL (ref 0–1)

## 2021-11-05 PROCEDURE — 81003 URINALYSIS AUTO W/O SCOPE: CPT | Performed by: NURSE PRACTITIONER

## 2021-11-05 PROCEDURE — 51798 US URINE CAPACITY MEASURE: CPT | Performed by: NURSE PRACTITIONER

## 2021-11-05 PROCEDURE — 99214 OFFICE O/P EST MOD 30 MIN: CPT | Performed by: NURSE PRACTITIONER

## 2021-11-05 RX ORDER — SOLIFENACIN SUCCINATE 10 MG/1
10 TABLET, FILM COATED ORAL DAILY
Qty: 30 TABLET | Refills: 2 | Status: SHIPPED | OUTPATIENT
Start: 2021-11-05 | End: 2022-01-07 | Stop reason: SDUPTHER

## 2021-11-05 ASSESSMENT — ENCOUNTER SYMPTOMS
NAUSEA: 0
VOMITING: 0
BACK PAIN: 0
ABDOMINAL PAIN: 0

## 2021-11-05 NOTE — PROGRESS NOTES
[address] [facility].  [address]  [facility] [address]  Dept: [phone]  Loc: [phone]    Visit Date: 11/5/2021        HPI:     Monique Gonsales is a 77 y.o. female who presents today for:  Chief Complaint   Patient presents with    Follow-up     urinary incontience        HPI   Pt seen in follow up incontinence. Lyndon Baumgarten has a hx of urinary incontinence spanning years that worsened in the last 12 months. Notes episodes of complete incontinence. Not significant frequency or urgency. Notes \"it just happens and I don't really have control\". Was started on oxybutynin 10 mg XL daily with improvement in symptoms but notes it causes her mouth to be dry and she gets an \"odd taste in her mouth\" from the medication. Is using usually 1 pad per day and 1-2 per night. At last office visit oxybutynin was stopped and she was started on Vesicare 5 mg daily    Reports urinary incontinence is improving though she does still wake up with some urine on her pad in the mornings. Denies any odd taste in the mouth or dry mouth with the vesicare. Current Outpatient Medications   Medication Sig Dispense Refill    solifenacin (VESICARE) 5 MG tablet Take 1 tablet by mouth daily 30 tablet 2    gabapentin (NEURONTIN) 600 MG tablet Take 600 mg by mouth 3 times daily.  Ascorbic Acid (VITAMIN C PO) Take by mouth      ZINC PO Take by mouth daily      VITAMIN A PO Take by mouth daily      vitamin D (CHOLECALCIFEROL) 5000 UNITS CAPS capsule Take 7,500 Units by mouth daily Alternate between 5000 and 94174      TraMADol HCl  MG CP24 Take 100 mg by mouth 3 times daily.  atorvastatin (LIPITOR) 40 MG tablet Take 40 mg by mouth daily.  warfarin (COUMADIN) 5 MG tablet Take by mouth Fluctuates due to factor 5, controlled by Dr. Eric Galdamez       No current facility-administered medications for this visit.        Past Medical History  Charanjit Zarate  has a past medical history of Arthritis, Factor V deficiency ([facility] [address] Hx of blood clots, Hyperlipidemia, and Hypothyroidism due to acquired atrophy of thyroid. Past Surgical History  The patient  has a past surgical history that includes Toe Surgery (2004? ?); Tubal ligation (1996); Colonoscopy; Foot surgery (Left, 09/24/14); and joint replacement (2009/2018). Family History  This patient's family history includes COPD in her father; Cancer in her father and paternal grandmother; Heart Attack in her paternal grandfather; Heart Disease in her paternal grandfather; High Blood Pressure in her father and mother; High Cholesterol in her mother; Other in her maternal grandmother, mother, and sister; Stroke in her maternal grandfather. Social History  [facility]  reports that she has never smoked. She has never used smokeless tobacco. She reports current alcohol use. She reports that she does not use drugs. Subjective:      Review of Systems   Constitutional: Negative for activity change, appetite change, chills, diaphoresis, fatigue, fever and unexpected weight change. Gastrointestinal: Negative for abdominal pain, nausea and vomiting. Genitourinary: Negative for decreased urine volume, difficulty urinating, dysuria, flank pain, frequency, hematuria and urgency. Incontinence   Musculoskeletal: Negative for back pain. Objective:   /78   Ht 5' 7\" (1.702 m)   Wt 242 lb (109.8 kg)   BMI 37.90 kg/m²     Physical Exam  Vitals reviewed. Constitutional:       General: She is not in acute distress. Appearance: Normal appearance. She is well-developed. She is not ill-appearing or diaphoretic. HENT:      Head: Normocephalic and atraumatic. Right Ear: External ear normal.      Left Ear: External ear normal.      Nose: Nose normal.      Mouth/Throat:      Mouth: Mucous membranes are moist.   Eyes:      General: No scleral icterus. Right eye: No discharge. Left eye: No discharge. Neck:      Vascular: No JVD. Trachea: No tracheal deviation. Pulmonary:      Effort: Pulmonary effort is normal. No respiratory distress. Musculoskeletal:         General: No tenderness. Normal range of motion. Neurological:      Mental Status: She is alert and oriented to person, place, and time. Mental status is at baseline. Psychiatric:         Mood and Affect: Mood normal.         Behavior: Behavior normal.         Thought Content: Thought content normal.         POC  Results for POC orders placed in visit on 11/05/21   POCT Urinalysis No Micro (Auto)   Result Value Ref Range    Glucose, Ur Negative NEGATIVE mg/dl    Bilirubin Urine Negative     Ketones, Urine Negative NEGATIVE    Specific Gravity, Urine >= 1.030 1.002 - 1.030    Blood, UA POC Negative NEGATIVE    pH, Urine 6.00 5.0 - 9.0    Protein, Urine Negative NEGATIVE mg/dl    Urobilinogen, Urine 1.00 0.0 - 1.0 eu/dl    Nitrite, Urine Negative NEGATIVE    Leukocyte Clumps, Urine Small (A) NEGATIVE    Color, Urine Yellow YELLOW-STRAW    Character, Urine Clear CLR-SL.CLOUD   poct post void residual   Result Value Ref Range    post void residual 0 ml       Patients recent PSA values are as follows  No results found for: PSA, PSADIA     Recent BUN/Creatinine:  Lab Results   Component Value Date    BUN 10 08/12/2019    CREATININE 0.9 08/12/2019         Assessment:   Urinary incontinence  Hx Factor V Lieden and blood clots on coumadin    Plan:     Increase Vesicare to 10 mg daily.   F/u in 4-6 weeks with PVR

## 2022-01-07 ENCOUNTER — TELEPHONE (OUTPATIENT)
Dept: UROLOGY | Age: 67
End: 2022-01-07

## 2022-01-07 RX ORDER — SOLIFENACIN SUCCINATE 10 MG/1
10 TABLET, FILM COATED ORAL DAILY
Qty: 30 TABLET | Refills: 2 | Status: SHIPPED | OUTPATIENT
Start: 2022-01-07 | End: 2022-01-26 | Stop reason: SDUPTHER

## 2022-01-07 NOTE — TELEPHONE ENCOUNTER
Patient needs a refill for vesicare. Last office visit 11/05/2021.  Next office visit 01/26/2022    Thank you

## 2022-01-26 ENCOUNTER — OFFICE VISIT (OUTPATIENT)
Dept: UROLOGY | Age: 67
End: 2022-01-26
Payer: COMMERCIAL

## 2022-01-26 VITALS
WEIGHT: 237 LBS | SYSTOLIC BLOOD PRESSURE: 128 MMHG | DIASTOLIC BLOOD PRESSURE: 78 MMHG | HEIGHT: 67 IN | BODY MASS INDEX: 37.2 KG/M2

## 2022-01-26 DIAGNOSIS — R31.29 MICROSCOPIC HEMATURIA: ICD-10-CM

## 2022-01-26 DIAGNOSIS — R32 URINARY INCONTINENCE, UNSPECIFIED TYPE: Primary | ICD-10-CM

## 2022-01-26 LAB
BACTERIA: ABNORMAL
BILIRUBIN URINE: NEGATIVE
BILIRUBIN URINE: NEGATIVE
BLOOD URINE, POC: ABNORMAL
BLOOD, URINE: ABNORMAL
CASTS: ABNORMAL /LPF
CASTS: ABNORMAL /LPF
CHARACTER, URINE: CLEAR
CHARACTER, URINE: CLEAR
COLOR, URINE: YELLOW
COLOR: YELLOW
CRYSTALS: ABNORMAL
EPITHELIAL CELLS, UA: ABNORMAL /HPF
GLUCOSE URINE: NEGATIVE MG/DL
GLUCOSE, URINE: NEGATIVE MG/DL
KETONES, URINE: NEGATIVE
KETONES, URINE: NEGATIVE
LEUKOCYTE CLUMPS, URINE: ABNORMAL
LEUKOCYTE ESTERASE, URINE: ABNORMAL
MISCELLANEOUS LAB TEST RESULT: ABNORMAL
NITRITE, URINE: NEGATIVE
NITRITE, URINE: NEGATIVE
PH UA: 5.5 (ref 5–9)
PH, URINE: 5.5 (ref 5–9)
POST VOID RESIDUAL (PVR): 26 ML
PROTEIN UA: NEGATIVE MG/DL
PROTEIN, URINE: NEGATIVE MG/DL
RBC URINE: ABNORMAL /HPF
RENAL EPITHELIAL, UA: ABNORMAL
SPECIFIC GRAVITY UA: 1.02 (ref 1–1.03)
SPECIFIC GRAVITY, URINE: 1.02 (ref 1–1.03)
UROBILINOGEN, URINE: 0.2 EU/DL (ref 0–1)
UROBILINOGEN, URINE: 0.2 EU/DL (ref 0–1)
WBC UA: ABNORMAL /HPF
YEAST: ABNORMAL

## 2022-01-26 PROCEDURE — 81003 URINALYSIS AUTO W/O SCOPE: CPT | Performed by: NURSE PRACTITIONER

## 2022-01-26 PROCEDURE — 99213 OFFICE O/P EST LOW 20 MIN: CPT | Performed by: NURSE PRACTITIONER

## 2022-01-26 PROCEDURE — 51798 US URINE CAPACITY MEASURE: CPT | Performed by: NURSE PRACTITIONER

## 2022-01-26 RX ORDER — SOLIFENACIN SUCCINATE 10 MG/1
10 TABLET, FILM COATED ORAL DAILY
Qty: 90 TABLET | Refills: 4 | Status: SHIPPED | OUTPATIENT
Start: 2022-01-26 | End: 2022-05-10

## 2022-01-26 ASSESSMENT — ENCOUNTER SYMPTOMS
NAUSEA: 0
VOMITING: 0
BACK PAIN: 0
ABDOMINAL PAIN: 0

## 2022-01-26 NOTE — PROGRESS NOTES
[address] [facility]  [address] [place] [id number]  Dept: [phone]  Loc: [phone]    Visit Date: 1/26/2022        HPI:     Taz Driscoll is a 77 y.o. female who presents today for:  Chief Complaint   Patient presents with    Follow-up    Incontinence       HPI   Pt seen in follow up for urinary incontinence. Raj Moe has a hx of urinary incontinence spanning years that worsened in the last 12 months.  Notes episodes of complete incontinence.  Not significant frequency or urgency.  Notes \"it just happens and I don't really have control\". Had side effects with Oxybutynin. Vesicare increased at last visit to 10 mg daily. Notes overall symptoms improved. Daytime frequency, urgency, incontinence mainly resolved. Now just still having some overnight leaking. Dry overnight 3-4 x per week. Current Outpatient Medications   Medication Sig Dispense Refill    solifenacin (VESICARE) 10 MG tablet Take 1 tablet by mouth daily 30 tablet 2    gabapentin (NEURONTIN) 600 MG tablet Take 600 mg by mouth 3 times daily.  Ascorbic Acid (VITAMIN C PO) Take by mouth      ZINC PO Take by mouth daily      VITAMIN A PO Take by mouth daily      vitamin D (CHOLECALCIFEROL) 5000 UNITS CAPS capsule Take 7,500 Units by mouth daily Alternate between 5000 and 89058      TraMADol HCl  MG CP24 Take 100 mg by mouth 3 times daily.  atorvastatin (LIPITOR) 40 MG tablet Take 40 mg by mouth daily.  warfarin (COUMADIN) 5 MG tablet Take by mouth Fluctuates due to factor 5, controlled by Dr. Rosina Bay       No current facility-administered medications for this visit. Past Medical History  Rogerio Robertson  has a past medical history of Arthritis, Factor V deficiency ([facility] [address] Hx of blood clots, Hyperlipidemia, and Hypothyroidism due to acquired atrophy of thyroid.     Past Surgical History  The patient  has a past surgical history that includes Toe Surgery (2004? ?); Tubal ligation (1996); Colonoscopy; Foot surgery (Left, 09/24/14); and joint replacement (2009/2018). Family History  This patient's family history includes COPD in her father; Cancer in her father and paternal grandmother; Heart Attack in her paternal grandfather; Heart Disease in her paternal grandfather; High Blood Pressure in her father and mother; High Cholesterol in her mother; Other in her maternal grandmother, mother, and sister; Stroke in her maternal grandfather. Social History  Sergio Mustafa  reports that she has never smoked. She has never used smokeless tobacco. She reports current alcohol use. She reports that she does not use drugs. Subjective:      Review of Systems   Constitutional: Negative for activity change, appetite change, chills, diaphoresis, fatigue, fever and unexpected weight change. Gastrointestinal: Negative for abdominal pain, nausea and vomiting. Genitourinary: Negative for decreased urine volume, difficulty urinating, dysuria, flank pain, frequency, hematuria and urgency. Musculoskeletal: Negative for back pain. Objective:   /78   Ht 5' 7\" (1.702 m)   Wt 237 lb (107.5 kg)   BMI 37.12 kg/m²     Physical Exam  Vitals reviewed. Constitutional:       General: She is not in acute distress. Appearance: Normal appearance. She is well-developed. She is not ill-appearing or diaphoretic. HENT:      Head: Normocephalic and atraumatic. Right Ear: External ear normal.      Left Ear: External ear normal.      Nose: Nose normal.      Mouth/Throat:      Mouth: Mucous membranes are moist.   Eyes:      General: No scleral icterus. Right eye: No discharge. Left eye: No discharge. Neck:      Vascular: No JVD. Trachea: No tracheal deviation. Pulmonary:      Effort: Pulmonary effort is normal. No respiratory distress. Musculoskeletal:         General: No tenderness. Normal range of motion.    Neurological:      Mental Status: She is alert and oriented to person, place, and time. Mental status is at baseline. Psychiatric:         Mood and Affect: Mood normal.         Behavior: Behavior normal.         Thought Content: Thought content normal.         POC  Results for POC orders placed in visit on 01/26/22   POCT Urinalysis No Micro (Auto)   Result Value Ref Range    Glucose, Ur Negative NEGATIVE mg/dl    Bilirubin Urine Negative     Ketones, Urine Negative NEGATIVE    Specific Gravity, Urine 1.020 1.002 - 1.030    Blood, UA POC Trace-lysed NEGATIVE    pH, Urine 5.50 5.0 - 9.0    Protein, Urine Negative NEGATIVE mg/dl    Urobilinogen, Urine 0.20 0.0 - 1.0 eu/dl    Nitrite, Urine Negative NEGATIVE    Leukocyte Clumps, Urine Small (A) NEGATIVE    Color, Urine Yellow YELLOW-STRAW    Character, Urine Clear CLR-SL.CLOUD   poct post void residual   Result Value Ref Range    post void residual 26 ml         Patients recent PSA values are as follows  No results found for: PSA, PSADIA     Recent BUN/Creatinine:  Lab Results   Component Value Date    BUN 10 08/12/2019    CREATININE 0.9 08/12/2019       Assessment:   Urinary incontinence  Hx Factor V Lieden and blood clots on coumadin    Plan:     Continue Vesicare 10 mg daily. Pt reports she is happy with improvement in symptoms at this time. If would like further improvement in the future may consider advanced therapies for OAB/incontinence.

## 2022-01-27 LAB
ORGANISM: ABNORMAL
URINE CULTURE, ROUTINE: ABNORMAL

## 2022-05-10 ENCOUNTER — OFFICE VISIT (OUTPATIENT)
Dept: UROLOGY | Age: 67
End: 2022-05-10
Payer: COMMERCIAL

## 2022-05-10 VITALS
DIASTOLIC BLOOD PRESSURE: 80 MMHG | WEIGHT: 239 LBS | BODY MASS INDEX: 37.51 KG/M2 | SYSTOLIC BLOOD PRESSURE: 128 MMHG | HEIGHT: 67 IN

## 2022-05-10 DIAGNOSIS — R32 URINARY INCONTINENCE, UNSPECIFIED TYPE: Primary | ICD-10-CM

## 2022-05-10 LAB
BILIRUBIN URINE: NEGATIVE
BLOOD URINE, POC: NEGATIVE
CHARACTER, URINE: CLEAR
COLOR, URINE: YELLOW
GLUCOSE URINE: NEGATIVE MG/DL
KETONES, URINE: NEGATIVE
LEUKOCYTE CLUMPS, URINE: NEGATIVE
NITRITE, URINE: NEGATIVE
PH, URINE: 5.5 (ref 5–9)
POST VOID RESIDUAL (PVR): 114 ML
PROTEIN, URINE: NEGATIVE MG/DL
SPECIFIC GRAVITY, URINE: 1.02 (ref 1–1.03)
UROBILINOGEN, URINE: 0.2 EU/DL (ref 0–1)

## 2022-05-10 PROCEDURE — 51798 US URINE CAPACITY MEASURE: CPT | Performed by: NURSE PRACTITIONER

## 2022-05-10 PROCEDURE — 81003 URINALYSIS AUTO W/O SCOPE: CPT | Performed by: NURSE PRACTITIONER

## 2022-05-10 PROCEDURE — 99214 OFFICE O/P EST MOD 30 MIN: CPT | Performed by: NURSE PRACTITIONER

## 2022-05-10 ASSESSMENT — ENCOUNTER SYMPTOMS
BACK PAIN: 0
ABDOMINAL PAIN: 0
NAUSEA: 0
VOMITING: 0

## 2022-05-10 NOTE — PROGRESS NOTES
Michael  [address]  [address]  [facility] [address]  Dept: [phone]  Loc: [phone]    Visit Date: 5/10/2022        HPI:     Javier Connor is a 77 y.o. female who presents today for:  Chief Complaint   Patient presents with    Follow-up    Incontinence       HPI   Pt seen in follow up for urinary incontinence. Jose has a hx of urinary incontinence spanning years that worsened in 2021.  Notes episodes of complete incontinence.  Not significant frequency or urgency.  Notes \"it just happens and I don't really have control\". Had side effects with Oxybutynin. Vesicare improved symptoms but it had side effects with a foul taste in the mouth and she had to stop it. Having worsening in incontinence again. Going through 1-2 # 5 pads overnight. Using 1-2 pads per day. Some frequency and urgency but during the day time it isn't bad. Current Outpatient Medications   Medication Sig Dispense Refill    gabapentin (NEURONTIN) 600 MG tablet Take 600 mg by mouth 3 times daily.  Ascorbic Acid (VITAMIN C PO) Take by mouth      ZINC PO Take by mouth daily      VITAMIN A PO Take by mouth daily      vitamin D (CHOLECALCIFEROL) 5000 UNITS CAPS capsule Take 7,500 Units by mouth daily Alternate between 5000 and 12791      TraMADol HCl  MG CP24 Take 100 mg by mouth 3 times daily.  atorvastatin (LIPITOR) 40 MG tablet Take 40 mg by mouth daily.  warfarin (COUMADIN) 5 MG tablet Take by mouth Fluctuates due to factor 5, controlled by Dr. Kofi Hayden solifenacin (VESICARE) 10 MG tablet Take 1 tablet by mouth daily (Patient not taking: Reported on 5/10/2022) 90 tablet 4     No current facility-administered medications for this visit.        Past Medical History  [facility]  has a past medical history of Arthritis, Factor V deficiency ([facility] [address] Hx of blood clots, Hyperlipidemia, and Hypothyroidism due to acquired atrophy of thyroid. Past Surgical History  The patient  has a past surgical history that includes Toe Surgery (2004? ?); Tubal ligation (1996); Colonoscopy; Foot surgery (Left, 09/24/14); and joint replacement (2009/2018). Family History  This patient's family history includes COPD in her father; Cancer in her father and paternal grandmother; Heart Attack in her paternal grandfather; Heart Disease in her paternal grandfather; High Blood Pressure in her father and mother; High Cholesterol in her mother; Other in her maternal grandmother, mother, and sister; Stroke in her maternal grandfather. Social History  Amber Wang  reports that she has never smoked. She has never used smokeless tobacco. She reports current alcohol use. She reports that she does not use drugs. Subjective:      Review of Systems   Constitutional: Negative for activity change, appetite change, chills, diaphoresis, fatigue, fever and unexpected weight change. Gastrointestinal: Negative for abdominal pain, nausea and vomiting. Genitourinary: Positive for frequency and urgency. Negative for decreased urine volume, difficulty urinating, dysuria, flank pain and hematuria. Musculoskeletal: Negative for back pain. Objective:   /80   Ht 5' 7\" (1.702 m)   Wt 239 lb (108.4 kg)   BMI 37.43 kg/m²     Physical Exam  Vitals reviewed. Constitutional:       General: She is not in acute distress. Appearance: Normal appearance. She is well-developed. She is not ill-appearing or diaphoretic. HENT:      Head: Normocephalic and atraumatic. Right Ear: External ear normal.      Left Ear: External ear normal.      Nose: Nose normal.      Mouth/Throat:      Mouth: Mucous membranes are moist.   Eyes:      General: No scleral icterus. Right eye: No discharge. Left eye: No discharge. Neck:      Vascular: No JVD. Trachea: No tracheal deviation. Pulmonary:      Effort: Pulmonary effort is normal. No respiratory distress. Abdominal:      General: There is no distension. Tenderness: There is no abdominal tenderness. There is no right CVA tenderness or left CVA tenderness. Musculoskeletal:         General: No tenderness. Normal range of motion. Neurological:      Mental Status: She is alert and oriented to person, place, and time. Mental status is at baseline. Psychiatric:         Mood and Affect: Mood normal.         Behavior: Behavior normal.         Thought Content: Thought content normal.        POC  Results for POC orders placed in visit on 05/10/22   POCT Urinalysis No Micro (Auto)   Result Value Ref Range    Glucose, Ur Negative NEGATIVE mg/dl    Bilirubin Urine Negative     Ketones, Urine Negative NEGATIVE    Specific Gravity, Urine 1.020 1.002 - 1.030    Blood, UA POC Negative NEGATIVE    pH, Urine 5.50 5.0 - 9.0    Protein, Urine Negative NEGATIVE mg/dl    Urobilinogen, Urine 0.20 0.0 - 1.0 eu/dl    Nitrite, Urine Negative NEGATIVE    Leukocyte Clumps, Urine Negative NEGATIVE    Color, Urine Yellow YELLOW-STRAW    Character, Urine Clear CLR-SL.CLOUD   poct post void residual   Result Value Ref Range    post void residual 114 ml         Patients recent PSA values are as follows  No results found for: PSA, PSADIA     Recent BUN/Creatinine:  Lab Results   Component Value Date    BUN 10 08/12/2019    CREATININE 0.9 08/12/2019       Assessment:   Urinary incontinence  Hx Factor V Lieden and blood clots on coumadin    Plan:     Discussed trial of Beta agonist therapy vs consideration of advanced therapies of incontinence. She would like to trial beta agonist therapy. May consider interstim if medication ineffective. Start Myrbetriq 50 mg daily. F/u in 2-3 months with PVR.

## 2022-09-06 NOTE — TELEPHONE ENCOUNTER
Carmen Villar called requesting a refill on the following medications:  Requested Prescriptions     Pending Prescriptions Disp Refills    MYRBETRIQ 50 MG TB24 [Pharmacy Med Name: MYRBETRIQ 50MG TABLETS] 30 tablet 3     Sig: TAKE [id number] Chad Dasilva verified:  .[email or web address]      Date of last visit: 05/10/2022  Date of next visit (if applicable): 8/97/6457

## 2022-09-07 RX ORDER — MIRABEGRON 50 MG/1
TABLET, FILM COATED, EXTENDED RELEASE ORAL
Qty: 30 TABLET | Refills: 3 | Status: SHIPPED | OUTPATIENT
Start: 2022-09-07 | End: 2022-09-13 | Stop reason: SDUPTHER

## 2022-09-13 ENCOUNTER — OFFICE VISIT (OUTPATIENT)
Dept: UROLOGY | Age: 67
End: 2022-09-13
Payer: COMMERCIAL

## 2022-09-13 VITALS
DIASTOLIC BLOOD PRESSURE: 72 MMHG | SYSTOLIC BLOOD PRESSURE: 134 MMHG | BODY MASS INDEX: 37.2 KG/M2 | HEIGHT: 67 IN | WEIGHT: 237 LBS

## 2022-09-13 DIAGNOSIS — R32 URINARY INCONTINENCE, UNSPECIFIED TYPE: Primary | ICD-10-CM

## 2022-09-13 DIAGNOSIS — R31.29 MICROSCOPIC HEMATURIA: ICD-10-CM

## 2022-09-13 LAB
BILIRUBIN URINE: NEGATIVE
BLOOD URINE, POC: NEGATIVE
CHARACTER, URINE: CLEAR
COLOR, URINE: YELLOW
GLUCOSE URINE: NEGATIVE MG/DL
KETONES, URINE: ABNORMAL
LEUKOCYTE CLUMPS, URINE: ABNORMAL
NITRITE, URINE: NEGATIVE
PH, URINE: 5.5 (ref 5–9)
POST VOID RESIDUAL (PVR): 165 ML
PROTEIN, URINE: NEGATIVE MG/DL
SPECIFIC GRAVITY, URINE: >= 1.03 (ref 1–1.03)
UROBILINOGEN, URINE: 1 EU/DL (ref 0–1)

## 2022-09-13 PROCEDURE — 99213 OFFICE O/P EST LOW 20 MIN: CPT | Performed by: NURSE PRACTITIONER

## 2022-09-13 PROCEDURE — 1123F ACP DISCUSS/DSCN MKR DOCD: CPT | Performed by: NURSE PRACTITIONER

## 2022-09-13 PROCEDURE — 81003 URINALYSIS AUTO W/O SCOPE: CPT | Performed by: NURSE PRACTITIONER

## 2022-09-13 PROCEDURE — 51798 US URINE CAPACITY MEASURE: CPT | Performed by: NURSE PRACTITIONER

## 2022-09-13 ASSESSMENT — ENCOUNTER SYMPTOMS
VOMITING: 0
ABDOMINAL PAIN: 0
NAUSEA: 0
BACK PAIN: 0

## 2022-09-13 NOTE — PROGRESS NOTES
Michael [address] [place] [address].  [address]  [facility] [address]  Dept: [phone]  Loc: [phone]    Visit Date: 9/13/2022        HPI:     Edward Arreguin is a 79 y.o. female who presents today for:  Chief Complaint   Patient presents with    Follow-up    Incontinence       HPI  Pt seen in follow up for urinary incontinence. Raheel Phillip has a hx of urinary incontinence spanning years that worsened in 2021. Notes episodes of complete incontinence. Not significant frequency or urgency. Notes \"it just happens and I don't really have control\". Had side effects with Oxybutynin. Vesicare improved symptoms but it had side effects with a foul taste in the mouth and she had to stop it. At appt 5/2022 pt reported having worsening in incontinence again. Going through 1-2 # 5 pads overnight. Using 1-2 pads per day. Started on Myrbetriq 50 mg daily and is here today in follow-up. Reports her urinary symptoms are improved. Reports the Myrbetriq is helping. Minimal leaking through the day and most nights she is dry. Now just wearing the #5 pad at night as an insurance policy. Current Outpatient Medications   Medication Sig Dispense Refill    MYRBETRIQ 50 MG TB24 TAKE 1 TABLET BY MOUTH DAILY 30 tablet 3    gabapentin (NEURONTIN) 600 MG tablet Take 600 mg by mouth 3 times daily. Ascorbic Acid (VITAMIN C PO) Take by mouth      ZINC PO Take by mouth daily      VITAMIN A PO Take by mouth daily      vitamin D (CHOLECALCIFEROL) 5000 UNITS CAPS capsule Take 7,500 Units by mouth daily Alternate between 5000 and 12124      TraMADol HCl  MG CP24 Take 100 mg by mouth 3 times daily. atorvastatin (LIPITOR) 40 MG tablet Take 40 mg by mouth daily. warfarin (COUMADIN) 5 MG tablet Take by mouth Fluctuates due to factor 5, controlled by Dr. Bong Wray       No current facility-administered medications for this visit.        Past Medical History  Faviola Menjivar  has a past medical history of Arthritis, Factor V deficiency ([address] Hx of blood clots, Hyperlipidemia, and Hypothyroidism due to acquired atrophy of thyroid. Past Surgical History  The patient  has a past surgical history that includes Toe Surgery (2004? ?); Tubal ligation (1996); Colonoscopy; Foot surgery (Left, 09/24/14); and joint replacement (2009/2018). Family History  This patient's family history includes COPD in her father; Cancer in her father and paternal grandmother; Heart Attack in her paternal grandfather; Heart Disease in her paternal grandfather; High Blood Pressure in her father and mother; High Cholesterol in her mother; Other in her maternal grandmother, mother, and sister; Stroke in her maternal grandfather. Social History  Faviola Menjivar  reports that she has never smoked. She has never used smokeless tobacco. She reports current alcohol use. She reports that she does not use drugs. Subjective:      Review of Systems   Constitutional:  Negative for activity change, appetite change, chills, diaphoresis, fatigue, fever and unexpected weight change. Gastrointestinal:  Negative for abdominal pain, nausea and vomiting. Genitourinary:  Negative for decreased urine volume, difficulty urinating, dysuria, flank pain, frequency, hematuria and urgency. + incontinence   Musculoskeletal:  Negative for back pain. Objective:   /72   Ht 5' 7\" (1.702 m)   Wt 237 lb (107.5 kg)   BMI 37.12 kg/m²     Physical Exam  Vitals reviewed. Constitutional:       General: She is not in acute distress. Appearance: Normal appearance. She is well-developed. She is not ill-appearing or diaphoretic. HENT:      Head: Normocephalic and atraumatic. Right Ear: External ear normal.      Left Ear: External ear normal.      Nose: Nose normal.      Mouth/Throat:      Mouth: Mucous membranes are moist.   Eyes:      General: No scleral icterus. Right eye: No discharge. Left eye: No discharge. Neck:      Vascular: No JVD. Trachea: No tracheal deviation. Pulmonary:      Effort: Pulmonary effort is normal. No respiratory distress. Musculoskeletal:         General: No tenderness. Normal range of motion. Neurological:      Mental Status: She is alert and oriented to person, place, and time. Mental status is at baseline. Psychiatric:         Mood and Affect: Mood normal.         Behavior: Behavior normal.         Thought Content: Thought content normal.       POC  Results for POC orders placed in visit on 09/13/22   POCT Urinalysis No Micro (Auto)   Result Value Ref Range    Glucose, Ur Negative NEGATIVE mg/dl    Bilirubin Urine Negative     Ketones, Urine Trace (A) NEGATIVE    Specific Gravity, Urine >= 1.030 1.002 - 1.030    Blood, UA POC Negative NEGATIVE    pH, Urine 5.50 5.0 - 9.0    Protein, Urine Negative NEGATIVE mg/dl    Urobilinogen, Urine 1.00 0.0 - 1.0 eu/dl    Nitrite, Urine Negative NEGATIVE    Leukocyte Clumps, Urine Trace (A) NEGATIVE    Color, Urine Yellow YELLOW-STRAW    Character, Urine Clear CLR-SL.CLOUD   poct post void residual   Result Value Ref Range    post void residual 165 ml         Patients recent PSA values are as follows  No results found for: PSA, PSADIA     Recent BUN/Creatinine:  Lab Results   Component Value Date/Time    BUN 10 08/12/2019 04:42 PM    CREATININE 0.9 08/12/2019 04:42 PM       Assessment:   Urinary incontinence  Hx Factor V Lieden and blood clots on coumadin     Plan:     Urinary incontinence improved on Myrbetriq. Refill sent to pharmacy. PVR borderline. No feeling of incomplete emptying. Discussed voiding strategies. F/u in 6 months with PVR.

## 2022-10-11 ENCOUNTER — TRANSCRIBE ORDERS (OUTPATIENT)
Dept: ADMINISTRATIVE | Age: 67
End: 2022-10-11

## 2022-10-11 DIAGNOSIS — R10.31 ABDOMINAL PAIN, RIGHT LOWER QUADRANT: Primary | ICD-10-CM

## 2022-10-11 DIAGNOSIS — R31.9 HEMATURIA, UNSPECIFIED TYPE: ICD-10-CM

## 2022-10-11 DIAGNOSIS — M54.50 LOW BACK PAIN, UNSPECIFIED BACK PAIN LATERALITY, UNSPECIFIED CHRONICITY, UNSPECIFIED WHETHER SCIATICA PRESENT: ICD-10-CM

## 2022-10-11 DIAGNOSIS — R53.83 OTHER FATIGUE: ICD-10-CM

## 2022-11-02 ENCOUNTER — HOSPITAL ENCOUNTER (OUTPATIENT)
Dept: CT IMAGING | Age: 67
Discharge: HOME | End: 2022-11-02
Payer: COMMERCIAL

## 2022-11-02 DIAGNOSIS — M54.50 LOW BACK PAIN, UNSPECIFIED BACK PAIN LATERALITY, UNSPECIFIED CHRONICITY, UNSPECIFIED WHETHER SCIATICA PRESENT: ICD-10-CM

## 2022-11-02 DIAGNOSIS — R31.9 HEMATURIA, UNSPECIFIED TYPE: ICD-10-CM

## 2022-11-02 DIAGNOSIS — R53.83 OTHER FATIGUE: ICD-10-CM

## 2022-11-02 DIAGNOSIS — R10.31 ABDOMINAL PAIN, RIGHT LOWER QUADRANT: ICD-10-CM

## 2022-11-02 LAB — POC CREATININE WHOLE BLOOD: 0.8 MG/DL (ref 0.5–1.2)

## 2022-11-02 PROCEDURE — 82565 ASSAY OF CREATININE: CPT

## 2022-11-02 PROCEDURE — 6360000004 HC RX CONTRAST MEDICATION: Performed by: NURSE PRACTITIONER

## 2022-11-02 PROCEDURE — 74178 CT ABD&PLV WO CNTR FLWD CNTR: CPT

## 2022-11-02 RX ADMIN — IOPAMIDOL 80 ML: 755 INJECTION, SOLUTION INTRAVENOUS at 16:02

## 2022-11-15 RX ORDER — GABAPENTIN 300 MG/1
CAPSULE ORAL
COMMUNITY
Start: 2022-10-08

## 2022-11-15 RX ORDER — ROSUVASTATIN CALCIUM 5 MG/1
TABLET, COATED ORAL
COMMUNITY
Start: 2022-10-07

## 2022-11-15 RX ORDER — TRAMADOL HYDROCHLORIDE 50 MG/1
TABLET ORAL
COMMUNITY
Start: 2022-10-13

## 2023-01-05 ENCOUNTER — HOSPITAL ENCOUNTER (OUTPATIENT)
Dept: ULTRASOUND IMAGING | Age: 68
Discharge: HOME | End: 2023-01-05
Payer: COMMERCIAL

## 2023-01-05 DIAGNOSIS — R31.9 HEMATURIA, UNSPECIFIED TYPE: ICD-10-CM

## 2023-01-05 DIAGNOSIS — R10.2 PERINEAL NEURALGIA, UNSPECIFIED LATERALITY: ICD-10-CM

## 2023-01-05 DIAGNOSIS — D25.9 UTERINE LEIOMYOMA, UNSPECIFIED LOCATION: ICD-10-CM

## 2023-01-05 PROCEDURE — 76830 TRANSVAGINAL US NON-OB: CPT

## 2023-01-23 ENCOUNTER — OFFICE VISIT (OUTPATIENT)
Dept: NEPHROLOGY | Age: 68
End: 2023-01-23
Payer: COMMERCIAL

## 2023-01-23 VITALS
WEIGHT: 242 LBS | DIASTOLIC BLOOD PRESSURE: 78 MMHG | OXYGEN SATURATION: 98 % | BODY MASS INDEX: 37.9 KG/M2 | HEART RATE: 72 BPM | SYSTOLIC BLOOD PRESSURE: 138 MMHG

## 2023-01-23 DIAGNOSIS — N28.1 SIMPLE RENAL CYST: Primary | ICD-10-CM

## 2023-01-23 PROBLEM — D68.51 FACTOR 5 LEIDEN MUTATION, HETEROZYGOUS (HCC): Status: ACTIVE | Noted: 2017-10-31

## 2023-01-23 PROBLEM — M16.9 OSTEOARTHRITIS OF HIP: Status: ACTIVE | Noted: 2018-01-15

## 2023-01-23 PROBLEM — R73.02 IMPAIRED GLUCOSE TOLERANCE: Status: ACTIVE | Noted: 2019-09-25

## 2023-01-23 PROCEDURE — 1123F ACP DISCUSS/DSCN MKR DOCD: CPT | Performed by: INTERNAL MEDICINE

## 2023-01-23 PROCEDURE — 99203 OFFICE O/P NEW LOW 30 MIN: CPT | Performed by: INTERNAL MEDICINE

## 2023-01-23 NOTE — PROGRESS NOTES
[address] [facility]  [address]  Dept: [phone]  Loc: [phone]  Outpatient Consult Note  1/23/2023 1:11 PM        Pt Name:    Zamzam Agnes:    1955  Primary Care Physician:  Aurea Cole MD     Chief Complaint:   Chief Complaint   Patient presents with    New Patient     Ref Ms. ADRIAN Shah-CNP cyst on kidney         Background Information/HPI   The patient is a 79y.o. year old white female who was incidentally noted to have Right kidney cyst. She says she was having some intermittent abdominal pain and noted have 5 mm simple renal cyst. She was referred to us for evaluation. No urinary complaints. No dysuria. No hematuria. No leg swelling. No hx DM. No stents or PPM. No hx COPD. No hx smoking. No hx kidney stones. No hx CVA. No hx stroke. Works at [employer]. No hx HTN. No diuretics. No leg swelling. No flank pain on urination. No family hx of kidney problems. Bp is reported to be stable. Allergies:  Patient has no known allergies. Past Medical History:  Past Medical History:   Diagnosis Date    Arthritis     Factor V deficiency ([facility] [address] 2009    Hx of blood clots 2009    lungs - factor 5 diagnosed    Hyperlipidemia     Cassidy Curet, CNP    Hypothyroidism due to acquired atrophy of thyroid 11/19/2015        Past Surgical History:  Past Surgical History:   Procedure Laterality Date    COLONOSCOPY      last one 2013?? states had one more recent but not sure on the year     FOOT SURGERY Left 09/24/14    achilles tendon debridement    JOINT REPLACEMENT  2009/2018    left hip/rt hip    TOE SURGERY  2004? ?     left 5th    TUBAL LIGATION  1996        Family History:  Family History   Problem Relation Age of Onset    High Blood Pressure Mother     High Cholesterol Mother     Other Mother         a fib    Cancer Father         esophogeal    High Blood Pressure Father COPD Father     Other Sister         tumor in stomach    Other Maternal Grandmother         pulmonary fibrosis    Stroke Maternal Grandfather     Cancer Paternal Grandmother         colon, liver    Heart Disease Paternal Grandfather     Heart Attack Paternal Grandfather         Social History:  Social History     Socioeconomic History    Marital status:      Spouse name: Not on file    Number of children: Not on file    Years of education: Not on file    Highest education level: Not on file   Occupational History    Not on file   Tobacco Use    Smoking status: Never    Smokeless tobacco: Never   Substance and Sexual Activity    Alcohol use: Yes     Alcohol/week: 0.0 standard drinks     Comment: occasional    Drug use: No    Sexual activity: Yes     Partners: Male   Other Topics Concern    Not on file   Social History Narrative    Not on file     Social Determinants of Health     Financial Resource Strain: Not on file   Food Insecurity: Not on file   Transportation Needs: Not on file   Physical Activity: Not on file   Stress: Not on file   Social Connections: Not on file   Intimate Partner Violence: Not on file   Housing Stability: Not on file        Review of Systems:  Constitutional: no fever or chills  Head: No headaches  Eyes: no blurry vision, no discharge  Ears: no ear pain or hearing changes  Nose: no runny nose or epistaxis  Respiratory: no shortness of breath or cough or sputum production  Cardiovascular: no chest pain  GI: no nausea, no vomiting or diarrhea  : denies any discharge  Skin: no rash  Musculoskeletal: no joint pain, moves all ext  Neuro: no numbness or tingling, no slurred speech  Psychiatric: stable mood, no depression or insomnia    All other review of systems were reviewed and negative     Home Medications:  Prior to Admission medications    Medication Sig Start Date End Date Taking?  Authorizing Provider   rosuvastatin (CRESTOR) 5 MG tablet  10/7/22  Yes Historical Provider, MD traMADol (ULTRAM) 50 MG tablet TAKE 1 TABLET BY MOUTH EVERY 4 HOURS AS NEEDED FOR PAIN. MAX 4 TABLETS DAILY. DO NOT FILL UNTIL 10/12/2022 10/13/22  Yes Historical Provider, MD   gabapentin (NEURONTIN) 300 MG capsule TAKE 2 CAPSULES BY MOUTH THREE TIMES DAILY. DO NOT REILL UNTIL 10/08/22 10/8/22  Yes Historical Provider, MD   mirabegron (MYRBETRIQ) 50 MG TB24 Take 50 mg by mouth daily 9/13/22  Yes JOE Ivory - CNP   Ascorbic Acid (VITAMIN C PO) Take by mouth   Yes Historical Provider, MD   ZINC PO Take by mouth daily   Yes Historical Provider, MD   VITAMIN A PO Take by mouth daily   Yes Historical Provider, MD   vitamin D (CHOLECALCIFEROL) 5000 UNITS CAPS capsule Take 7,500 Units by mouth daily Alternate between 5000 and 26017   Yes Historical Provider, MD   warfarin (COUMADIN) 5 MG tablet Take by mouth Fluctuates due to factor 5, controlled by Dr. Costa Austin   Yes Historical Provider, MD        Physical Examination:  VITALS:  /78 (Site: Left Upper Arm, Position: Sitting, Cuff Size: Large Adult) Comment: manual  Pulse 72   Wt 242 lb (109.8 kg)   SpO2 98%   BMI 37.90 kg/m²   Body mass index is 37.9 kg/m². Wt Readings from Last 3 Encounters:   01/23/23 242 lb (109.8 kg)   09/13/22 237 lb (107.5 kg)   05/10/22 239 lb (108.4 kg)     Constitutional and General Appearance: alert and cooperative with exam, appears comfortable, no distress, not diaphoretic  Eyes: no icteric sclera in left eye or right eye,  no pallor conjunctiva in left or right eye, no discharge seen from left eye or right eye  Ears and Nose: normal external appearance of left and right ear. No active drainage from nose.    Oral: moist oral mucus membranes  Neck: No jugular venous distention visibly noted  Lungs: Air entry B/L, no crackles or rales, no use of accessory muscles or labored breathing  Skin: no rash seen on exposed extremities, warm to touch  Musculo: moves all extremities  Neuro: no slurred speech, no facial drooping  Psychiatric: Normal mood and affect, Not agitated     Laboratory & Diagnostics:  Old progress notes from referring physician reviewed. Pelvic US: thickened endometrium, + fibroid uterus    Nov 2022: Creat 0.8  Sept 2022: UA: trace ketone, no protein, No blood       Impression/Plan:   1. Simple cyst: no intervention need for simple renal cyst. Repeat Us in 1 year. Check BMP and UA. Ultrasound shows 5 mm exophytic simple renal cyst.  Will monitor  2. Renal; creatinine is stable  3. Fibroid uterus: seeing Gynecologist.   4. DJD: on ultram per pain mgmt  5. Factor V Leiden  6. Urinary incontinence. Follows with urology    Orders Placed This Encounter   Procedures    US RENAL COMPLETE    Basic Metabolic Panel    Protein / creatinine ratio, urine    Urinalysis     Return in about 1 year (around 1/23/2024).     Thought process was discussed with the patient  Thank you for the referral  Please do not hesitate to contact me if you have any questions or concerns  I will make further recommendations depending on clinical course and lab/diagnostic results      Monica Carson MD  [facility]

## 2023-03-17 ENCOUNTER — HOSPITAL ENCOUNTER (EMERGENCY)
Age: 68
Discharge: HOME | End: 2023-03-17
Attending: STUDENT IN AN ORGANIZED HEALTH CARE EDUCATION/TRAINING PROGRAM
Payer: COMMERCIAL

## 2023-03-17 ENCOUNTER — APPOINTMENT (OUTPATIENT)
Dept: GENERAL RADIOLOGY | Age: 68
End: 2023-03-17
Payer: COMMERCIAL

## 2023-03-17 VITALS
TEMPERATURE: 97.9 F | BODY MASS INDEX: 38.57 KG/M2 | DIASTOLIC BLOOD PRESSURE: 77 MMHG | OXYGEN SATURATION: 95 % | SYSTOLIC BLOOD PRESSURE: 124 MMHG | HEART RATE: 65 BPM | RESPIRATION RATE: 16 BRPM | HEIGHT: 66 IN | WEIGHT: 240 LBS

## 2023-03-17 DIAGNOSIS — S30.0XXA CONTUSION OF LOWER BACK, INITIAL ENCOUNTER: ICD-10-CM
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
## 2023-03-17 DIAGNOSIS — S39.012A BACK STRAIN, INITIAL ENCOUNTER: ICD-10-CM

## 2023-03-17 LAB
ANION GAP SERPL CALC-SCNC: 10 MEQ/L (ref 8–16)
BASOPHILS ABSOLUTE: 0 THOU/MM3 (ref 0–0.1)
BASOPHILS NFR BLD AUTO: 0.5 %
BUN SERPL-MCNC: 10 MG/DL (ref 7–22)
CALCIUM SERPL-MCNC: 8.9 MG/DL (ref 8.5–10.5)
CHLORIDE SERPL-SCNC: 108 MEQ/L (ref 98–111)
CO2 SERPL-SCNC: 25 MEQ/L (ref 23–33)
CREAT SERPL-MCNC: 0.8 MG/DL (ref 0.4–1.2)
DEPRECATED RDW RBC AUTO: 47.9 FL (ref 35–45)
EOSINOPHIL NFR BLD AUTO: 1.4 %
EOSINOPHILS ABSOLUTE: 0.1 THOU/MM3 (ref 0–0.4)
ERYTHROCYTE [DISTWIDTH] IN BLOOD BY AUTOMATED COUNT: 13.5 % (ref 11.5–14.5)
GFR SERPL CREATININE-BSD FRML MDRD: > 60 ML/MIN/1.73M2
GLUCOSE SERPL-MCNC: 96 MG/DL (ref 70–108)
HCT VFR BLD AUTO: 41.7 % (ref 37–47)
HGB BLD-MCNC: 13.1 GM/DL (ref 12–16)
IMM GRANULOCYTES # BLD AUTO: 0.02 THOU/MM3 (ref 0–0.07)
IMM GRANULOCYTES NFR BLD AUTO: 0.3 %
INR PPP: 1.09 (ref 0.85–1.13)
LYMPHOCYTES ABSOLUTE: 1.5 THOU/MM3 (ref 1–4.8)
LYMPHOCYTES NFR BLD AUTO: 19.7 %
MCH RBC QN AUTO: 30.1 PG (ref 26–33)
MCHC RBC AUTO-ENTMCNC: 31.4 GM/DL (ref 32.2–35.5)
MCV RBC AUTO: 95.9 FL (ref 81–99)
MONOCYTES ABSOLUTE: 0.5 THOU/MM3 (ref 0.4–1.3)
MONOCYTES NFR BLD AUTO: 6.6 %
NEUTROPHILS NFR BLD AUTO: 71.5 %
NRBC BLD AUTO-RTO: 0 /100 WBC
OSMOLALITY SERPL CALC.SUM OF ELEC: 283.9 MOSMOL/KG (ref 275–300)
PLATELET # BLD AUTO: 258 THOU/MM3 (ref 130–400)
PMV BLD AUTO: 10.8 FL (ref 9.4–12.4)
POTASSIUM SERPL-SCNC: 3.7 MEQ/L (ref 3.5–5.2)
RBC # BLD AUTO: 4.35 MILL/MM3 (ref 4.2–5.4)
SEGMENTED NEUTROPHILS ABSOLUTE COUNT: 5.6 THOU/MM3 (ref 1.8–7.7)
SODIUM SERPL-SCNC: 143 MEQ/L (ref 135–145)
WBC # BLD AUTO: 7.8 THOU/MM3 (ref 4.8–10.8)

## 2023-03-17 PROCEDURE — 93005 ELECTROCARDIOGRAM TRACING: CPT | Performed by: STUDENT IN AN ORGANIZED HEALTH CARE EDUCATION/TRAINING PROGRAM

## 2023-03-17 PROCEDURE — 85610 PROTHROMBIN TIME: CPT

## 2023-03-17 PROCEDURE — 71101 X-RAY EXAM UNILAT RIBS/CHEST: CPT

## 2023-03-17 PROCEDURE — 6360000002 HC RX W HCPCS: Performed by: STUDENT IN AN ORGANIZED HEALTH CARE EDUCATION/TRAINING PROGRAM

## 2023-03-17 PROCEDURE — 99285 EMERGENCY DEPT VISIT HI MDM: CPT

## 2023-03-17 PROCEDURE — 36415 COLL VENOUS BLD VENIPUNCTURE: CPT

## 2023-03-17 PROCEDURE — 85025 COMPLETE CBC W/AUTO DIFF WBC: CPT

## 2023-03-17 PROCEDURE — 6370000000 HC RX 637 (ALT 250 FOR IP): Performed by: STUDENT IN AN ORGANIZED HEALTH CARE EDUCATION/TRAINING PROGRAM

## 2023-03-17 PROCEDURE — 80048 BASIC METABOLIC PNL TOTAL CA: CPT

## 2023-03-17 PROCEDURE — 96372 THER/PROPH/DIAG INJ SC/IM: CPT

## 2023-03-17 RX ORDER — HYDROCODONE BITARTRATE AND ACETAMINOPHEN 5; 325 MG/1; MG/1
1 TABLET ORAL ONCE
Status: COMPLETED | OUTPATIENT
Start: 2023-03-17 | End: 2023-03-17

## 2023-03-17 RX ORDER — CYCLOBENZAPRINE HCL 10 MG
10 TABLET ORAL 3 TIMES DAILY PRN
Qty: 21 TABLET | Refills: 0 | Status: SHIPPED | OUTPATIENT
Start: 2023-03-17 | End: 2023-03-27

## 2023-03-17 RX ORDER — LIDOCAINE 4 G/G
1 PATCH TOPICAL ONCE
Status: DISCONTINUED | OUTPATIENT
Start: 2023-03-17 | End: 2023-03-17 | Stop reason: HOSPADM

## 2023-03-17 RX ORDER — ORPHENADRINE CITRATE 30 MG/ML
60 INJECTION INTRAMUSCULAR; INTRAVENOUS ONCE
Status: COMPLETED | OUTPATIENT
Start: 2023-03-17 | End: 2023-03-17

## 2023-03-17 RX ORDER — LIDOCAINE 50 MG/G
1 PATCH TOPICAL DAILY
Qty: 10 PATCH | Refills: 0 | Status: SHIPPED | OUTPATIENT
Start: 2023-03-17 | End: 2023-03-27

## 2023-03-17 RX ADMIN — HYDROCODONE BITARTRATE AND ACETAMINOPHEN 1 TABLET: 5; 325 TABLET ORAL at 14:34

## 2023-03-17 RX ADMIN — ORPHENADRINE CITRATE 60 MG: 30 INJECTION INTRAMUSCULAR; INTRAVENOUS at 14:34

## 2023-03-17 ASSESSMENT — PAIN SCALES - GENERAL
PAINLEVEL_OUTOF10: 9
PAINLEVEL_OUTOF10: 8
PAINLEVEL_OUTOF10: 9

## 2023-03-17 ASSESSMENT — PAIN - FUNCTIONAL ASSESSMENT
PAIN_FUNCTIONAL_ASSESSMENT: 0-10
PAIN_FUNCTIONAL_ASSESSMENT: 0-10

## 2023-03-17 NOTE — ED NOTES
Patient medicated per MAR. Labs collected by . Family at bedside. Call light in reach. Will continue to monitor.      Mor Costello RN  03/17/23 [id number]

## 2023-03-17 NOTE — DISCHARGE INSTRUCTIONS
Take your medications as prescribed. May take Tylenol Motrin as needed for pain control. Follow-up with your primary care physician next 2 days for reevaluation of your symptoms. Return to the emergency department for any new or worsening symptoms.
12.7

## 2023-03-17 NOTE — ED TRIAGE NOTES
Pt presents to the ED with c/o fall and mid back pain. Pt reports she slipped on her steps inside and hit her mid back on one of the steps. Pt reports she is on a blood thinner. Pt denies hitting head or LOC. Pt rates the mid back pain 7/10 at this time.  vss

## 2023-03-17 NOTE — ED PROVIDER NOTES
[address] [address] EMERGENCY DEPT      EMERGENCY MEDICINE     Pt Name: Gautam Harris  MRN: [id number]  [place] 1955  Date of evaluation: 3/17/2023  Provider: Aneta Olivares MD  Supervising Physician: Dr Tee Ferris       Chief Complaint   Patient presents with    Fall    Back Pain     HISTORY OF PRESENT ILLNESS   Gautam Harris is a 79 y.o. female with past medical history of hyperlipidemia and blood clots and factor V Leiden deficiency who presents to the emergency department for trip and fall down 3 stairs landing on her right back skating down a few steps. No head or neck injury no loss of conscious. She is able to ambulate after the patient comes in with only right back pain      PASTMEDICAL HISTORY     Past Medical History:   Diagnosis Date    Arthritis     Factor V deficiency ([facility] [address] 2009    Hx of blood clots 2009    lungs - factor 5 diagnosed    Hyperlipidemia     Verneice Comings, CNP    Hypothyroidism due to acquired atrophy of thyroid 11/19/2015       Patient Active Problem List   Diagnosis Code    Tired R53.83    Aches R52    Snores R06.83    Constipation K59.00    Numbness R20.0    Personal history of PE (pulmonary embolism) Z86.711    Anticoagulant long-term use Z79.01    Hypothyroidism due to acquired atrophy of thyroid E03.4    Increased PTH level E34.9    Factor 5 Leiden mutation, heterozygous ([facility] [address] D68.51    Impaired glucose tolerance R73.02    Osteoarthritis of hip M16.9     SURGICAL HISTORY       Past Surgical History:   Procedure Laterality Date    COLONOSCOPY      last one 2013?? states had one more recent but not sure on the year     FOOT SURGERY Left 09/24/14    achilles tendon debridement    JOINT REPLACEMENT  2009/2018    left hip/rt hip    TOE SURGERY  2004? ?     left 5th    TUBAL LIGATION  1996       CURRENT MEDICATIONS       Previous Medications    ASCORBIC ACID (VITAMIN C PO)    Take by mouth    GABAPENTIN (NEURONTIN) 300 MG CAPSULE    TAKE 2 CAPSULES BY MOUTH THREE TIMES DAILY. DO NOT REILL UNTIL 10/08/22    MIRABEGRON (MYRBETRIQ) 50 MG TB24    Take 50 mg by mouth daily    ROSUVASTATIN (CRESTOR) 5 MG TABLET        TRAMADOL (ULTRAM) 50 MG TABLET    TAKE 1 TABLET BY MOUTH EVERY 4 HOURS AS NEEDED FOR PAIN. MAX 4 TABLETS DAILY. DO NOT FILL UNTIL 10/12/2022    VITAMIN A PO    Take by mouth daily    VITAMIN D (CHOLECALCIFEROL) 5000 UNITS CAPS CAPSULE    Take 7,500 Units by mouth daily Alternate between 5000 and 69674    WARFARIN (COUMADIN) 5 MG TABLET    Take by mouth Fluctuates due to factor 5, controlled by Dr. Roselia Horn by mouth daily       ALLERGIES     has No Known Allergies. FAMILY HISTORY     She indicated that her mother is alive. She indicated that her father is . She indicated that both of her sisters are alive. She indicated that her maternal grandmother is . She indicated that her maternal grandfather is . She indicated that her paternal grandmother is . She indicated that her paternal grandfather is . SOCIAL HISTORY       Social History     Tobacco Use    Smoking status: Never    Smokeless tobacco: Never   Substance Use Topics    Alcohol use: Yes     Alcohol/week: 0.0 standard drinks     Comment: occasional    Drug use: No       PHYSICAL EXAM       ED Triage Vitals [23 1319]   BP Temp Temp Source Heart Rate Resp SpO2 Height Weight   (!) 150/87 97.9 °F (36.6 °C) Oral 80 15 95 % 5' 6\" (1.676 m) 240 lb (108.9 kg)       Physical Exam  Vitals and nursing note reviewed. Constitutional:       General: She is not in acute distress. Appearance: She is not toxic-appearing or diaphoretic. HENT:      Head: Normocephalic and atraumatic. Right Ear: External ear normal.      Left Ear: External ear normal.      Nose: Nose normal.      Mouth/Throat:      Mouth: Mucous membranes are moist.      Pharynx: Oropharynx is clear. Eyes:      General: No scleral icterus.      Conjunctiva/sclera: Conjunctivae normal.   Cardiovascular:      Rate and Rhythm: Normal rate and regular rhythm. Pulses: Normal pulses. Heart sounds: Normal heart sounds. Pulmonary:      Effort: Pulmonary effort is normal. No respiratory distress. Breath sounds: Normal breath sounds. Abdominal:      General: Abdomen is flat. There is no distension. Palpations: Abdomen is soft. Tenderness: There is no abdominal tenderness. There is no guarding or rebound. Musculoskeletal:         General: Normal range of motion. Cervical back: Normal range of motion and neck supple. No rigidity. No muscular tenderness. Comments: No C-spine T-spine L-spine tenderness midline no step-offs or deformities noted she has some right lumbar and thoracic paraspinal tenderness and spasming on the right side. No ecchymosis is noted   Lymphadenopathy:      Cervical: No cervical adenopathy. Skin:     General: Skin is warm and dry. Capillary Refill: Capillary refill takes less than 2 seconds. Coloration: Skin is not jaundiced. Neurological:      General: No focal deficit present. Mental Status: She is alert and oriented to person, place, and time. Psychiatric:         Mood and Affect: Mood normal.         Behavior: Behavior normal.         FORMAL DIAGNOSTIC RESULTS     RADIOLOGY: Interpretation per the Radiologist below, if available at the time of this note (none if blank):    XR RIBS RIGHT INCLUDE CHEST (MIN 3 VIEWS)   Final Result    Negative rib series. **This report has been created using voice recognition software. It may contain minor errors which are inherent in voice recognition technology. **      Final report electronically signed by Dr. Raad Mckeon MD on 3/17/2023 2:45 PM          LABS: (none if blank)  Labs Reviewed   CBC WITH AUTO DIFFERENTIAL - Abnormal; Notable for the following components:       Result Value    MCHC 31.4 (*)     RDW-SD 47.9 (*)     All other components within normal limits   BASIC METABOLIC PANEL   PROTIME-INR   ANION GAP   GLOMERULAR FILTRATION RATE, ESTIMATED   OSMOLALITY       (Any cultures that may have been sent were not resulted at the time of this patient visit)    [address] / ED COURSE:     External Documentation Reviewed:         Previous patient encounter documents & history available on EMR was reviewed: Patient was seen on 1/26/2022 for urinary incontinence      1)           Differential Diagnosis includes (but not limited to):  Contusion, sprain, rib fracture        Diagnoses Considered but I have low suspicion of:          Decision Rules/Clinical Scores utilized:               See Formal Diagnostic Results above for the lab and radiology tests and orders. 3)  Treatment and Disposition         ED Reassessment:  See ED course         Case discussed with consulting clinician:           Shared Decision-Making was performed and disposition discussed with the        Patient/Family and questions answered          Social determinants of health impacting treatment or disposition:  none      Summary of Patient Presentation:      ED Course as of 03/17/23 1536   Fri Mar 17, 2023   1511 XR RIBS RIGHT INCLUDE CHEST (MIN 3 VIEWS)  \"   IMPRESSION:   Negative rib series\" [AL]   1531 INR: 1.09  Subtherapeutic  [AL]      ED Course User Index  [AL] Rayo Gracia MD         MDM:   This is a well-appearing 71-year-old female who tripped on a few stairs falling on her right lower back no head or neck injury no loss of conscious GCS 15 there is no ecchymosis she is anticoagulated on Lovenox being currently bridged to be therapeutic on her Coumadin. She has no ecchymosis on examination and is hemodynamically stable hemoglobin at baseline. No rib fractures on x-ray. She is breathing comfortably will be discharged home.     Vitals Reviewed:    Vitals:    03/17/23 1319 03/17/23 1439   BP: (!) 150/87 124/77   Pulse: 80 65   Resp: 15 16   Temp: 97.9 °F (36.6 °C)    TempSrc: Oral    SpO2: 95% 95%   Weight: 240 lb (108.9 kg)    Height: 5' 6\" (1.676 m)        The patient was seen and examined. Appropriate diagnostic testing was performed and results reviewed with the patient. The results of pertinent diagnostic studies and exam findings were discussed. The patients provisional diagnosis and plan of care were discussed with the patient and present family who expressed understanding. Any medications were reviewed and indications and risks of medications were discussed with the patient /family present. Strict verbal and written return precautions, instructions and appropriate follow-up provided to  the patient . ED Medications administered this visit:  (None if blank)  Medications   lidocaine 4 % external patch 1 patch (1 patch TransDERmal Patch Applied 3/17/23 1434)   HYDROcodone-acetaminophen (NORCO) 5-325 MG per tablet 1 tablet (1 tablet Oral Given 3/17/23 1434)   orphenadrine (NORFLEX) injection 60 mg (60 mg IntraMUSCular Given 3/17/23 1434)         PROCEDURES: (None if blank)  Procedures:     CRITICAL CARE: (None if blank)      DISCHARGE PRESCRIPTIONS: (None if blank)  New Prescriptions    DICLOFENAC SODIUM (VOLTAREN) 1 % GEL    Apply 4 g topically 4 times daily    LIDOCAINE (LIDODERM) 5 %    Place 1 patch onto the skin daily for 10 days 12 hours on, 12 hours off. FINAL IMPRESSION      1. Fall, initial encounter    2. Back strain, initial encounter    3.  Contusion of lower back, initial encounter            DISPOSITION/PLAN   DISPOSITION Decision To Discharge 03/17/2023 03:34:07 PM      OUTPATIENT FOLLOW UP THE PATIENT:  Pebbles Faulkner MD  [facility] [id number] [phone]    Schedule an appointment as soon as possible for a visit in 3 days      MD Maycol Lane MD  Resident  03/17/23 [id number]

## 2023-03-18 LAB
EKG ATRIAL RATE: 76 BPM
EKG P AXIS: 52 DEGREES
EKG P-R INTERVAL: 168 MS
EKG Q-T INTERVAL: 384 MS
EKG QRS DURATION: 72 MS
EKG QTC CALCULATION (BAZETT): 432 MS
EKG R AXIS: 28 DEGREES
EKG T AXIS: 50 DEGREES
EKG VENTRICULAR RATE: 76 BPM

## 2023-03-18 PROCEDURE — 93010 ELECTROCARDIOGRAM REPORT: CPT | Performed by: INTERNAL MEDICINE

## 2023-04-18 ENCOUNTER — OFFICE VISIT (OUTPATIENT)
Dept: UROLOGY | Age: 68
End: 2023-04-18
Payer: COMMERCIAL

## 2023-04-18 VITALS
SYSTOLIC BLOOD PRESSURE: 118 MMHG | HEIGHT: 66 IN | WEIGHT: 240 LBS | DIASTOLIC BLOOD PRESSURE: 82 MMHG | BODY MASS INDEX: 38.57 KG/M2

## 2023-04-18 DIAGNOSIS — R32 URINARY INCONTINENCE, UNSPECIFIED TYPE: Primary | ICD-10-CM

## 2023-04-18 LAB
BILIRUBIN URINE: NEGATIVE
BLOOD URINE, POC: ABNORMAL
CHARACTER, URINE: CLEAR
COLOR, URINE: YELLOW
GLUCOSE URINE: NEGATIVE MG/DL
KETONES, URINE: ABNORMAL
LEUKOCYTE CLUMPS, URINE: NEGATIVE
NITRITE, URINE: NEGATIVE
PH, URINE: 5.5 (ref 5–9)
POST VOID RESIDUAL (PVR): 162 ML
PROTEIN, URINE: NEGATIVE MG/DL
SPECIFIC GRAVITY, URINE: 1.02 (ref 1–1.03)
UROBILINOGEN, URINE: 2 EU/DL (ref 0–1)

## 2023-04-18 PROCEDURE — 0509F URINE INCON PLAN DOCD: CPT | Performed by: NURSE PRACTITIONER

## 2023-04-18 PROCEDURE — 81003 URINALYSIS AUTO W/O SCOPE: CPT | Performed by: NURSE PRACTITIONER

## 2023-04-18 PROCEDURE — 1036F TOBACCO NON-USER: CPT | Performed by: NURSE PRACTITIONER

## 2023-04-18 PROCEDURE — 51798 US URINE CAPACITY MEASURE: CPT | Performed by: NURSE PRACTITIONER

## 2023-04-18 PROCEDURE — G8400 PT W/DXA NO RESULTS DOC: HCPCS | Performed by: NURSE PRACTITIONER

## 2023-04-18 PROCEDURE — 3017F COLORECTAL CA SCREEN DOC REV: CPT | Performed by: NURSE PRACTITIONER

## 2023-04-18 PROCEDURE — G8427 DOCREV CUR MEDS BY ELIG CLIN: HCPCS | Performed by: NURSE PRACTITIONER

## 2023-04-18 PROCEDURE — 99214 OFFICE O/P EST MOD 30 MIN: CPT | Performed by: NURSE PRACTITIONER

## 2023-04-18 PROCEDURE — 1090F PRES/ABSN URINE INCON ASSESS: CPT | Performed by: NURSE PRACTITIONER

## 2023-04-18 PROCEDURE — G8417 CALC BMI ABV UP PARAM F/U: HCPCS | Performed by: NURSE PRACTITIONER

## 2023-04-18 PROCEDURE — 1123F ACP DISCUSS/DSCN MKR DOCD: CPT | Performed by: NURSE PRACTITIONER

## 2023-04-18 ASSESSMENT — ENCOUNTER SYMPTOMS
BACK PAIN: 0
VOMITING: 0
ABDOMINAL PAIN: 0
NAUSEA: 0

## 2023-04-18 NOTE — PROGRESS NOTES
Left Ear: External ear normal.      Nose: Nose normal.      Mouth/Throat:      Mouth: Mucous membranes are moist.   Eyes:      General: No scleral icterus. Right eye: No discharge. Left eye: No discharge. Neck:      Vascular: No JVD. Trachea: No tracheal deviation. Cardiovascular:      Rate and Rhythm: Normal rate and regular rhythm. Pulmonary:      Effort: Pulmonary effort is normal. No respiratory distress. Breath sounds: Normal breath sounds. Abdominal:      General: There is no distension. Tenderness: There is no abdominal tenderness. There is no right CVA tenderness or left CVA tenderness. Musculoskeletal:         General: No tenderness. Normal range of motion. Skin:     General: Skin is warm and dry. Neurological:      Mental Status: She is alert and oriented to person, place, and time. Mental status is at baseline. Psychiatric:         Mood and Affect: Mood normal.         Behavior: Behavior normal.         Thought Content: Thought content normal.       POC  Results for POC orders placed in visit on 04/18/23   poct post void residual   Result Value Ref Range    post void residual 162 ml         Patients recent PSA values are as follows  No results found for: PSA, PSADIA     Recent BUN/Creatinine:  Lab Results   Component Value Date/Time    BUN 10 03/17/2023 02:41 PM    CREATININE 0.8 03/17/2023 02:41 PM       Assessment:   Urinary incontinence  Hx Factor V Lieden and blood clots on coumadin     Plan:     Continue Myrbetriq 50 mg daily. Refill sent to pharmacy.   F/u in 1 year with PVR

## 2023-04-28 ENCOUNTER — HOSPITAL ENCOUNTER (OUTPATIENT)
Dept: ULTRASOUND IMAGING | Age: 68
Discharge: HOME | End: 2023-04-28
Payer: COMMERCIAL

## 2023-04-28 DIAGNOSIS — R10.9 FLANK PAIN: ICD-10-CM

## 2023-04-28 PROCEDURE — 76770 US EXAM ABDO BACK WALL COMP: CPT

## 2023-05-01 ENCOUNTER — HOSPITAL ENCOUNTER (EMERGENCY)
Age: 68
Discharge: HOME | End: 2023-05-01
Attending: EMERGENCY MEDICINE
Payer: COMMERCIAL

## 2023-05-01 ENCOUNTER — APPOINTMENT (OUTPATIENT)
Dept: CT IMAGING | Age: 68
End: 2023-05-01
Payer: COMMERCIAL

## 2023-05-01 VITALS
HEART RATE: 84 BPM | RESPIRATION RATE: 16 BRPM | OXYGEN SATURATION: 96 % | HEIGHT: 66 IN | TEMPERATURE: 98 F | BODY MASS INDEX: 38.57 KG/M2 | SYSTOLIC BLOOD PRESSURE: 177 MMHG | WEIGHT: 240 LBS | DIASTOLIC BLOOD PRESSURE: 87 MMHG

## 2023-05-01 DIAGNOSIS — M62.838 MUSCLE SPASM OF LEFT LOWER EXTREMITY: Primary | ICD-10-CM

## 2023-05-01 LAB
ALBUMIN SERPL BCG-MCNC: 4 G/DL (ref 3.5–5.1)
ALP SERPL-CCNC: 139 U/L (ref 38–126)
ALT SERPL W/O P-5'-P-CCNC: 22 U/L (ref 11–66)
ANION GAP SERPL CALC-SCNC: 13 MEQ/L (ref 8–16)
AST SERPL-CCNC: 21 U/L (ref 5–40)
BASOPHILS ABSOLUTE: 0 THOU/MM3 (ref 0–0.1)
BASOPHILS NFR BLD AUTO: 0.6 %
BILIRUB SERPL-MCNC: 0.4 MG/DL (ref 0.3–1.2)
BUN SERPL-MCNC: 13 MG/DL (ref 7–22)
CALCIUM SERPL-MCNC: 9.2 MG/DL (ref 8.5–10.5)
CHLORIDE SERPL-SCNC: 103 MEQ/L (ref 98–111)
CO2 SERPL-SCNC: 22 MEQ/L (ref 23–33)
CREAT SERPL-MCNC: 0.7 MG/DL (ref 0.4–1.2)
DEPRECATED RDW RBC AUTO: 44.8 FL (ref 35–45)
EOSINOPHIL NFR BLD AUTO: 1.4 %
EOSINOPHILS ABSOLUTE: 0.1 THOU/MM3 (ref 0–0.4)
ERYTHROCYTE [DISTWIDTH] IN BLOOD BY AUTOMATED COUNT: 13.2 % (ref 11.5–14.5)
GFR SERPL CREATININE-BSD FRML MDRD: > 60 ML/MIN/1.73M2
GLUCOSE SERPL-MCNC: 125 MG/DL (ref 70–108)
HCT VFR BLD AUTO: 41.6 % (ref 37–47)
HGB BLD-MCNC: 13.3 GM/DL (ref 12–16)
IMM GRANULOCYTES # BLD AUTO: 0.02 THOU/MM3 (ref 0–0.07)
IMM GRANULOCYTES NFR BLD AUTO: 0.3 %
LYMPHOCYTES ABSOLUTE: 1.3 THOU/MM3 (ref 1–4.8)
LYMPHOCYTES NFR BLD AUTO: 18.7 %
MCH RBC QN AUTO: 29.4 PG (ref 26–33)
MCHC RBC AUTO-ENTMCNC: 32 GM/DL (ref 32.2–35.5)
MCV RBC AUTO: 92 FL (ref 81–99)
MONOCYTES ABSOLUTE: 0.5 THOU/MM3 (ref 0.4–1.3)
MONOCYTES NFR BLD AUTO: 7.1 %
NEUTROPHILS NFR BLD AUTO: 71.9 %
NRBC BLD AUTO-RTO: 0 /100 WBC
OSMOLALITY SERPL CALC.SUM OF ELEC: 277.3 MOSMOL/KG (ref 275–300)
PLATELET # BLD AUTO: 269 THOU/MM3 (ref 130–400)
PMV BLD AUTO: 9.9 FL (ref 9.4–12.4)
POTASSIUM SERPL-SCNC: 3.9 MEQ/L (ref 3.5–5.2)
PROT SERPL-MCNC: 7.3 G/DL (ref 6.1–8)
RBC # BLD AUTO: 4.52 MILL/MM3 (ref 4.2–5.4)
SEGMENTED NEUTROPHILS ABSOLUTE COUNT: 5 THOU/MM3 (ref 1.8–7.7)
SODIUM SERPL-SCNC: 138 MEQ/L (ref 135–145)
WBC # BLD AUTO: 6.9 THOU/MM3 (ref 4.8–10.8)

## 2023-05-01 PROCEDURE — 80053 COMPREHEN METABOLIC PANEL: CPT

## 2023-05-01 PROCEDURE — 6360000004 HC RX CONTRAST MEDICATION: Performed by: EMERGENCY MEDICINE

## 2023-05-01 PROCEDURE — 99285 EMERGENCY DEPT VISIT HI MDM: CPT

## 2023-05-01 PROCEDURE — 36415 COLL VENOUS BLD VENIPUNCTURE: CPT

## 2023-05-01 PROCEDURE — 74177 CT ABD & PELVIS W/CONTRAST: CPT

## 2023-05-01 PROCEDURE — 6370000000 HC RX 637 (ALT 250 FOR IP): Performed by: EMERGENCY MEDICINE

## 2023-05-01 PROCEDURE — 96374 THER/PROPH/DIAG INJ IV PUSH: CPT

## 2023-05-01 PROCEDURE — 6360000002 HC RX W HCPCS: Performed by: EMERGENCY MEDICINE

## 2023-05-01 PROCEDURE — 85025 COMPLETE CBC W/AUTO DIFF WBC: CPT

## 2023-05-01 PROCEDURE — 96372 THER/PROPH/DIAG INJ SC/IM: CPT

## 2023-05-01 RX ORDER — HYDROCODONE BITARTRATE AND ACETAMINOPHEN 5; 325 MG/1; MG/1
1 TABLET ORAL EVERY 8 HOURS PRN
Qty: 10 TABLET | Refills: 0 | Status: SHIPPED | OUTPATIENT
Start: 2023-05-01 | End: 2023-05-06

## 2023-05-01 RX ORDER — MORPHINE SULFATE 4 MG/ML
4 INJECTION, SOLUTION INTRAMUSCULAR; INTRAVENOUS ONCE
Status: COMPLETED | OUTPATIENT
Start: 2023-05-01 | End: 2023-05-01

## 2023-05-01 RX ORDER — ORPHENADRINE CITRATE 30 MG/ML
60 INJECTION INTRAMUSCULAR; INTRAVENOUS ONCE
Status: COMPLETED | OUTPATIENT
Start: 2023-05-01 | End: 2023-05-01

## 2023-05-01 RX ORDER — LIDOCAINE 50 MG/G
1 PATCH TOPICAL DAILY
Qty: 10 PATCH | Refills: 0 | Status: SHIPPED | OUTPATIENT
Start: 2023-05-01 | End: 2023-05-11

## 2023-05-01 RX ORDER — LIDOCAINE 4 G/G
2 PATCH TOPICAL ONCE
Status: DISCONTINUED | OUTPATIENT
Start: 2023-05-01 | End: 2023-05-01 | Stop reason: HOSPADM

## 2023-05-01 RX ORDER — ORPHENADRINE CITRATE 100 MG/1
100 TABLET, EXTENDED RELEASE ORAL 2 TIMES DAILY
Qty: 20 TABLET | Refills: 0 | Status: SHIPPED | OUTPATIENT
Start: 2023-05-01 | End: 2023-05-11

## 2023-05-01 RX ADMIN — ORPHENADRINE CITRATE 60 MG: 60 INJECTION INTRAMUSCULAR; INTRAVENOUS at 03:38

## 2023-05-01 RX ADMIN — IOPAMIDOL 80 ML: 755 INJECTION, SOLUTION INTRAVENOUS at 03:10

## 2023-05-01 RX ADMIN — MORPHINE SULFATE 4 MG: 4 INJECTION, SOLUTION INTRAMUSCULAR; INTRAVENOUS at 04:12

## 2023-05-01 ASSESSMENT — PAIN DESCRIPTION - FREQUENCY: FREQUENCY: CONTINUOUS

## 2023-05-01 ASSESSMENT — PAIN DESCRIPTION - LOCATION: LOCATION: LEG

## 2023-05-01 ASSESSMENT — PAIN - FUNCTIONAL ASSESSMENT
PAIN_FUNCTIONAL_ASSESSMENT: 0-10
PAIN_FUNCTIONAL_ASSESSMENT: 0-10

## 2023-05-01 ASSESSMENT — PAIN SCALES - GENERAL
PAINLEVEL_OUTOF10: 10

## 2023-05-01 ASSESSMENT — PAIN DESCRIPTION - ORIENTATION: ORIENTATION: LEFT

## 2023-05-01 ASSESSMENT — PAIN DESCRIPTION - DESCRIPTORS: DESCRIPTORS: SPASM

## 2023-05-01 NOTE — ED TRIAGE NOTES
PT to the ED with complaint of left leg muscle spasms and pain. PT states she has bursitis and she she felt this may be an exacerbation. PT states tat pain started Sunday morning. PT states she took tylenol and a muscle relaxer at home around 2300. Pt states pain is constant and 10/10 pain.

## 2023-05-01 NOTE — ED NOTES
PT resting in bed. Respirations even and unlabored. Call light in reach. PT denies needs at this time.      Liliana Bowling RN  05/01/23 8922

## 2023-05-01 NOTE — ED PROVIDER NOTES
[address] ENCOUNTER        PATIENT NAME: Jenae Gil  MRN: [id number]  : 1955  MAGDALENO: 2023  PROVIDER: Gunnar Gan MD    [address] [place] [address]       Chief Complaint   Patient presents with    Spasms     Left leg       Patient is seen and evaluated in a timely fashion. Nurses Notes are reviewed and I agree except as noted in the HPI. HISTORY OF PRESENT ILLNESS   Jenae Gil is a 79 y.o. female who presents to Emergency Department with Spasms (Left leg)     Patient presents for evaluation of left hip pain and spasm since about 20 hours ago. No recalled trauma or injury. Pain is persistent, from the left hip, radiating to left groin. Patient states pain is spastic in nature. She took muscle relaxant before arrival with little help. She has no back pain. No changes of bowel and bladder functions. No left lower extremity weakness. Past medical history remarkable for osteoarthritis, obesity, factor V Leiden deficiency, DVT on Coumadin, and hyperlipidemia. She is scheduled for D and C on 5/3/2023. Coumadin was held, patient currently is on Lovenox bridging. This HPI was provided by patient. PASTMEDICAL HISTORY    has a past medical history of Arthritis, Factor V deficiency ([facility] [address] Hx of blood clots, Hyperlipidemia, and Hypothyroidism due to acquired atrophy of thyroid. SURGICAL HISTORY      has a past surgical history that includes Toe Surgery (? ?); Tubal ligation (); Colonoscopy; Foot surgery (Left, 14); and joint replacement (). CURRENT MEDICATIONS       Previous Medications    ASCORBIC ACID (VITAMIN C PO)    Take by mouth    DICLOFENAC SODIUM (VOLTAREN) 1 % GEL    Apply 4 g topically 4 times daily    GABAPENTIN (NEURONTIN) 300 MG CAPSULE    TAKE 2 CAPSULES BY MOUTH THREE TIMES DAILY.  DO NOT REILL UNTIL 10/08/22    MIRABEGRON (MYRBETRIQ) 50 MG TB24    Take 50 mg by mouth daily    ROSUVASTATIN (CRESTOR) 5 MG TABLET

## 2023-12-03 ENCOUNTER — HOSPITAL ENCOUNTER (EMERGENCY)
Age: 68
Discharge: HOME | End: 2023-12-03
Payer: COMMERCIAL

## 2023-12-03 VITALS
SYSTOLIC BLOOD PRESSURE: 152 MMHG | RESPIRATION RATE: 18 BRPM | HEART RATE: 64 BPM | DIASTOLIC BLOOD PRESSURE: 84 MMHG | TEMPERATURE: 98.8 F | OXYGEN SATURATION: 94 %

## 2023-12-03 DIAGNOSIS — J40 BRONCHITIS: Primary | ICD-10-CM

## 2023-12-03 PROCEDURE — 99213 OFFICE O/P EST LOW 20 MIN: CPT

## 2023-12-03 PROCEDURE — 99213 OFFICE O/P EST LOW 20 MIN: CPT | Performed by: NURSE PRACTITIONER

## 2023-12-03 RX ORDER — ALBUTEROL SULFATE 90 UG/1
2 AEROSOL, METERED RESPIRATORY (INHALATION) EVERY 4 HOURS PRN
Qty: 18 G | Refills: 0 | Status: SHIPPED | OUTPATIENT
Start: 2023-12-03

## 2023-12-03 RX ORDER — DEXTROMETHORPHAN HYDROBROMIDE AND PROMETHAZINE HYDROCHLORIDE 15; 6.25 MG/5ML; MG/5ML
5 SYRUP ORAL 4 TIMES DAILY PRN
Qty: 118 ML | Refills: 0 | Status: SHIPPED | OUTPATIENT
Start: 2023-12-03 | End: 2023-12-10

## 2023-12-03 RX ORDER — AMOXICILLIN AND CLAVULANATE POTASSIUM 875; 125 MG/1; MG/1
1 TABLET, FILM COATED ORAL 2 TIMES DAILY
Qty: 14 TABLET | Refills: 0 | Status: SHIPPED | OUTPATIENT
Start: 2023-12-03 | End: 2023-12-10

## 2023-12-03 RX ORDER — PREDNISONE 20 MG/1
40 TABLET ORAL DAILY
Qty: 14 TABLET | Refills: 0 | Status: SHIPPED | OUTPATIENT
Start: 2023-12-03 | End: 2023-12-10

## 2023-12-03 RX ORDER — BENZONATATE 200 MG/1
200 CAPSULE ORAL 3 TIMES DAILY PRN
Qty: 21 CAPSULE | Refills: 0 | Status: SHIPPED | OUTPATIENT
Start: 2023-12-03 | End: 2023-12-10

## 2023-12-03 ASSESSMENT — ENCOUNTER SYMPTOMS
SORE THROAT: 0
DIARRHEA: 0
SHORTNESS OF BREATH: 0
NAUSEA: 0
COUGH: 1
VOMITING: 0

## 2023-12-03 NOTE — ED PROVIDER NOTES
[address]  Urgent Care Encounter       CHIEF COMPLAINT       Chief Complaint   Patient presents with    Cough     Congestion x 1 week  deep cough        Nurses Notes reviewed and I agree except as noted in the HPI. HISTORY OF PRESENT ILLNESS   Kartik Minor is a 76 y.o. female who presents for evaluation of cough and congestion that been ongoing for the past week. Patient states the cough is tight and nonproductive and is very persistent. States that this will keep her awake at night. She denies any fever or chills. She denies any known sick exposures. States that she has been using over-the-counter Mucinex DM with minimal relief of the symptoms. The history is provided by the patient. REVIEW OF SYSTEMS     Review of Systems   Constitutional:  Negative for chills and fever. HENT:  Positive for congestion. Negative for sore throat. Respiratory:  Positive for cough. Negative for shortness of breath. Cardiovascular:  Negative for chest pain. Gastrointestinal:  Negative for diarrhea, nausea and vomiting. Musculoskeletal:  Negative for arthralgias and myalgias. Skin:  Negative for rash. Allergic/Immunologic: Negative for immunocompromised state. Neurological:  Negative for headaches. 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 ?); Tubal ligation (1996); Colonoscopy; Foot surgery (Left, 09/24/14); and joint replacement (2009/2018).     CURRENT MEDICATIONS       Previous Medications    ASCORBIC ACID (VITAMIN C PO)    Take by mouth    DICLOFENAC SODIUM (VOLTAREN) 1 % GEL    Apply 4 g topically 4 times daily    GABAPENTIN (NEURONTIN) 300 MG CAPSULE    TAKE 2 TO:  Cheryle Neighbors, MD  [address] [facility] [address] / [facility] [address]      DISCHARGE MEDICATIONS:  New Prescriptions    ALBUTEROL SULFATE HFA (PROVENTIL;VENTOLIN;PROAIR) 108 (90 BASE) MCG/ACT INHALER    Inhale 2 puffs into the lungs every 4 hours as needed for Wheezing or Shortness of Breath    AMOXICILLIN-CLAVULANATE (AUGMENTIN) 875-125 MG PER TABLET    Take 1 tablet by mouth 2 times daily for 7 days    BENZONATATE (TESSALON) 200 MG CAPSULE    Take 1 capsule by mouth 3 times daily as needed for Cough    PREDNISONE (DELTASONE) 20 MG TABLET    Take 2 tablets by mouth daily for 7 days    PROMETHAZINE-DEXTROMETHORPHAN (PROMETHAZINE-DM) 6.25-15 MG/5ML SYRUP    Take 5 mLs by mouth 4 times daily as needed for Cough Do not drive or operate heavy machinery while taking this medication.        Discontinued Medications    No medications on file       Current Discharge Medication List          JOE Noel CNP    (Please note that portions of this note were completed with a voice recognition program. Efforts were made to edit the dictations but occasionally words are mis-transcribed.)          JOE Noel CNP  12/03/23 8010

## 2023-12-06 ENCOUNTER — HOSPITAL ENCOUNTER (EMERGENCY)
Age: 68
Discharge: HOME | End: 2023-12-06
Payer: COMMERCIAL

## 2023-12-06 ENCOUNTER — APPOINTMENT (OUTPATIENT)
Dept: GENERAL RADIOLOGY | Age: 68
End: 2023-12-06
Payer: COMMERCIAL

## 2023-12-06 ENCOUNTER — HOSPITAL ENCOUNTER (EMERGENCY)
Age: 68
Discharge: HOME | End: 2023-12-06
Attending: EMERGENCY MEDICINE
Payer: COMMERCIAL

## 2023-12-06 VITALS
WEIGHT: 224 LBS | DIASTOLIC BLOOD PRESSURE: 65 MMHG | HEIGHT: 66 IN | BODY MASS INDEX: 36 KG/M2 | SYSTOLIC BLOOD PRESSURE: 105 MMHG | RESPIRATION RATE: 18 BRPM | OXYGEN SATURATION: 92 % | HEART RATE: 53 BPM | TEMPERATURE: 97.5 F

## 2023-12-06 VITALS
RESPIRATION RATE: 16 BRPM | DIASTOLIC BLOOD PRESSURE: 92 MMHG | HEART RATE: 58 BPM | SYSTOLIC BLOOD PRESSURE: 163 MMHG | TEMPERATURE: 97.2 F | OXYGEN SATURATION: 96 %

## 2023-12-06 DIAGNOSIS — S39.012A BACK STRAIN, INITIAL ENCOUNTER: Primary | ICD-10-CM

## 2023-12-06 DIAGNOSIS — S30.1XXA CONTUSION OF FLANK, INITIAL ENCOUNTER: Primary | ICD-10-CM

## 2023-12-06 DIAGNOSIS — M62.838 SPASM OF MUSCLE: ICD-10-CM

## 2023-12-06 LAB
BILIRUB UR STRIP.AUTO-MCNC: NEGATIVE MG/DL
CHARACTER UR: CLEAR
COLOR: YELLOW
GLUCOSE UR QL STRIP.AUTO: NEGATIVE MG/DL
KETONES UR QL STRIP.AUTO: NEGATIVE
NITRITE UR QL STRIP.AUTO: NEGATIVE
PH UR STRIP.AUTO: 5.5 [PH] (ref 5–9)
PROT UR STRIP.AUTO-MCNC: NEGATIVE MG/DL
RBC #/AREA URNS HPF: NEGATIVE /[HPF]
SP GR UR STRIP.AUTO: 1.01 (ref 1–1.03)
UROBILINOGEN, URINE: 0.2 EU/DL (ref 0.2–1)
WBC #/AREA URNS HPF: NEGATIVE /[HPF]

## 2023-12-06 PROCEDURE — 99213 OFFICE O/P EST LOW 20 MIN: CPT | Performed by: NURSE PRACTITIONER

## 2023-12-06 PROCEDURE — 81003 URINALYSIS AUTO W/O SCOPE: CPT

## 2023-12-06 PROCEDURE — 6370000000 HC RX 637 (ALT 250 FOR IP): Performed by: EMERGENCY MEDICINE

## 2023-12-06 PROCEDURE — 99284 EMERGENCY DEPT VISIT MOD MDM: CPT

## 2023-12-06 PROCEDURE — 96375 TX/PRO/DX INJ NEW DRUG ADDON: CPT

## 2023-12-06 PROCEDURE — 6360000002 HC RX W HCPCS: Performed by: EMERGENCY MEDICINE

## 2023-12-06 PROCEDURE — 99214 OFFICE O/P EST MOD 30 MIN: CPT

## 2023-12-06 PROCEDURE — 71101 X-RAY EXAM UNILAT RIBS/CHEST: CPT

## 2023-12-06 PROCEDURE — 96374 THER/PROPH/DIAG INJ IV PUSH: CPT

## 2023-12-06 PROCEDURE — 96372 THER/PROPH/DIAG INJ SC/IM: CPT

## 2023-12-06 PROCEDURE — 94640 AIRWAY INHALATION TREATMENT: CPT

## 2023-12-06 RX ORDER — KETOROLAC TROMETHAMINE 30 MG/ML
15 INJECTION, SOLUTION INTRAMUSCULAR; INTRAVENOUS ONCE
Status: COMPLETED | OUTPATIENT
Start: 2023-12-06 | End: 2023-12-06

## 2023-12-06 RX ORDER — MORPHINE SULFATE 4 MG/ML
4 INJECTION, SOLUTION INTRAMUSCULAR; INTRAVENOUS
Status: COMPLETED | OUTPATIENT
Start: 2023-12-06 | End: 2023-12-06

## 2023-12-06 RX ORDER — HYDROCODONE BITARTRATE AND ACETAMINOPHEN 5; 325 MG/1; MG/1
1 TABLET ORAL EVERY 6 HOURS PRN
Qty: 10 TABLET | Refills: 0 | Status: SHIPPED | OUTPATIENT
Start: 2023-12-06 | End: 2023-12-09

## 2023-12-06 RX ORDER — HYDROCODONE BITARTRATE AND ACETAMINOPHEN 5; 325 MG/1; MG/1
1 TABLET ORAL
Status: COMPLETED | OUTPATIENT
Start: 2023-12-06 | End: 2023-12-06

## 2023-12-06 RX ORDER — ORPHENADRINE CITRATE 30 MG/ML
60 INJECTION INTRAMUSCULAR; INTRAVENOUS ONCE
Status: COMPLETED | OUTPATIENT
Start: 2023-12-06 | End: 2023-12-06

## 2023-12-06 RX ORDER — NAPROXEN 500 MG/1
500 TABLET ORAL 2 TIMES DAILY PRN
Qty: 60 TABLET | Refills: 0 | Status: SHIPPED | OUTPATIENT
Start: 2023-12-06

## 2023-12-06 RX ORDER — DIAZEPAM 5 MG/1
5 TABLET ORAL ONCE
Status: COMPLETED | OUTPATIENT
Start: 2023-12-06 | End: 2023-12-06

## 2023-12-06 RX ORDER — IPRATROPIUM BROMIDE AND ALBUTEROL SULFATE 2.5; .5 MG/3ML; MG/3ML
1 SOLUTION RESPIRATORY (INHALATION) ONCE
Status: COMPLETED | OUTPATIENT
Start: 2023-12-06 | End: 2023-12-06

## 2023-12-06 RX ORDER — CYCLOBENZAPRINE HCL 10 MG
10 TABLET ORAL 3 TIMES DAILY PRN
Qty: 21 TABLET | Refills: 0 | Status: SHIPPED | OUTPATIENT
Start: 2023-12-06 | End: 2023-12-16

## 2023-12-06 RX ADMIN — ORPHENADRINE CITRATE 60 MG: 60 INJECTION INTRAMUSCULAR; INTRAVENOUS at 01:48

## 2023-12-06 RX ADMIN — IPRATROPIUM BROMIDE AND ALBUTEROL SULFATE 1 DOSE: .5; 3 SOLUTION RESPIRATORY (INHALATION) at 02:03

## 2023-12-06 RX ADMIN — KETOROLAC TROMETHAMINE 15 MG: 30 INJECTION, SOLUTION INTRAMUSCULAR; INTRAVENOUS at 01:46

## 2023-12-06 RX ADMIN — MORPHINE SULFATE 4 MG: 4 INJECTION, SOLUTION INTRAMUSCULAR; INTRAVENOUS at 03:33

## 2023-12-06 RX ADMIN — HYDROCODONE BITARTRATE AND ACETAMINOPHEN 1 TABLET: 5; 325 TABLET ORAL at 01:45

## 2023-12-06 RX ADMIN — DIAZEPAM 5 MG: 5 TABLET ORAL at 03:33

## 2023-12-06 ASSESSMENT — ENCOUNTER SYMPTOMS
SHORTNESS OF BREATH: 0
RHINORRHEA: 0
COUGH: 0
DIARRHEA: 0
SORE THROAT: 0
NAUSEA: 0
VOMITING: 0
CHEST TIGHTNESS: 0
BACK PAIN: 1

## 2023-12-06 ASSESSMENT — PAIN - FUNCTIONAL ASSESSMENT
PAIN_FUNCTIONAL_ASSESSMENT: 0-10
PAIN_FUNCTIONAL_ASSESSMENT: 0-10

## 2023-12-06 ASSESSMENT — PAIN DESCRIPTION - LOCATION
LOCATION: BACK
LOCATION: FLANK

## 2023-12-06 ASSESSMENT — PAIN SCALES - GENERAL
PAINLEVEL_OUTOF10: 10
PAINLEVEL_OUTOF10: 6
PAINLEVEL_OUTOF10: 7
PAINLEVEL_OUTOF10: 10
PAINLEVEL_OUTOF10: 7
PAINLEVEL_OUTOF10: 10
PAINLEVEL_OUTOF10: 4

## 2023-12-06 ASSESSMENT — PAIN DESCRIPTION - ORIENTATION: ORIENTATION: LEFT;MID

## 2023-12-06 ASSESSMENT — PAIN DESCRIPTION - PAIN TYPE: TYPE: ACUTE PAIN

## 2023-12-06 ASSESSMENT — PAIN DESCRIPTION - FREQUENCY: FREQUENCY: CONTINUOUS

## 2023-12-06 ASSESSMENT — PAIN DESCRIPTION - DESCRIPTORS: DESCRIPTORS: STABBING

## 2023-12-06 NOTE — ED NOTES
Pt resting in bed, no distress noted. States pain has not resolved and is a 10. Resp easy and unlabored.      Saranya Tariq RN  12/06/23 4477

## 2023-12-06 NOTE — ED PROVIDER NOTES
[facility]  Urgent Care Encounter       CHIEF COMPLAINT       Chief Complaint   Patient presents with    Back Pain     Left  flank area- seen in ER this morning was told its a muscle spasm but \" now that the pain med is wearing off the pain is as bad as before\"        Nurses Notes reviewed and I agree except as noted in the HPI. HISTORY OF PRESENT ILLNESS   Nato Aragon is a 76 y.o. female who presents to the [facility] urgent care for evaluation of back/left flank pain. She reports that she fell into the snowblower several days ago. She reports that she was seen in the emergency department last night for the injury. She reports that she was medicated and the pain had improved, but at this time the pain has returned and worsened. She has noted to have a small area of ecchymosis roughly 6 cm x 3 cm. She reports pain with deep respirations. She denies cough or dyspnea. Does report that she recently had a bronchitis. She was prescribed Flexeril, Norco and naproxen and did not pick these up from the pharmacy as she is on pain management and was unsure of what she should do. The history is provided by the patient. No  was used. REVIEW OF SYSTEMS     Review of Systems   Constitutional:  Negative for activity change, appetite change, chills, fatigue and fever. HENT:  Negative for ear discharge, ear pain, rhinorrhea and sore throat. Respiratory:  Negative for cough, chest tightness and shortness of breath. Cardiovascular:  Negative for chest pain. Gastrointestinal:  Negative for diarrhea, nausea and vomiting. Genitourinary:  Negative for dysuria. Musculoskeletal:  Positive for back pain. Skin:  Negative for rash. Allergic/Immunologic: Negative for environmental allergies and food allergies. Neurological:  Negative for dizziness and headaches.        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 Weight as of an earlier encounter on 12/6/23: 101.6 kg (224 lb). ,No LMP recorded. Patient is postmenopausal.    Physical Exam  Vitals and nursing note reviewed. Constitutional:       General: She is not in acute distress. Appearance: Normal appearance. She is not ill-appearing, toxic-appearing or diaphoretic. HENT:      Head: Normocephalic. Right Ear: Ear canal and external ear normal.      Left Ear: Ear canal and external ear normal.      Nose: Nose normal. No congestion or rhinorrhea. Mouth/Throat:      Mouth: Mucous membranes are moist.      Pharynx: Oropharynx is clear. No oropharyngeal exudate or posterior oropharyngeal erythema. Cardiovascular:      Rate and Rhythm: Normal rate. Pulses: Normal pulses. Pulmonary:      Effort: Pulmonary effort is normal. No respiratory distress. Breath sounds: No stridor. No wheezing or rhonchi. Abdominal:      General: Abdomen is flat. Bowel sounds are normal.      Palpations: Abdomen is soft. Musculoskeletal:         General: No swelling or tenderness. Normal range of motion. Cervical back: Normal range of motion. Back:    Neurological:      General: No focal deficit present. Mental Status: She is alert and oriented to person, place, and time.    Psychiatric:         Mood and Affect: Mood normal.         Behavior: Behavior normal.         DIAGNOSTIC RESULTS     Labs:  Results for orders placed or performed during the hospital encounter of 12/06/23   Urinalysis   Result Value Ref Range    Glucose, Ur Negative NEGATIVE mg/dl    Bilirubin Urine Negative NEGATIVE    Ketones, Urine Negative NEGATIVE    Specific Gravity, UA 1.015 1.002 - 1.030    Blood, Urine Negative NEGATIVE    pH, UA 5.50 5.0 - 9.0    Protein, UA Negative NEGATIVE mg/dl    Urobilinogen, Urine 0.20 0.2 - 1.0 eu/dl    Nitrite, Urine Negative NEGATIVE    Leukocyte Esterase, Urine Negative NEGATIVE    Color, UA Yellow STRAW-YELLOW

## 2023-12-06 NOTE — DISCHARGE INSTRUCTIONS
your Norco and muscle relaxer from the pharmacy and use as directed. Notify pain management of above pain medication that was previously prescribed. Follow-up with PCP in 3 to 5 days with continued or worsening pain. Present to the emergency department for dyspnea, coughing up blood blood in your urine, or other symptoms deemed emergent.

## 2023-12-06 NOTE — ED NOTES
Pt resting in bed with call light within reach. Rates pain 7/10 and states is tolerable. Respirations easy and unlabored.         Ana Chambers  12/06/23 2122

## 2023-12-06 NOTE — ED TRIAGE NOTES
Pt present to ED from home with chief complaint of back pain. Pt states pain began in her lower left back and side at 2130 12/5. Pt states she was just leaning over when pain began, states she fell last Friday. Pt took tylenol for pain, rates pain as a 10. Resp easy and unlabored. Pt was diagnosed with bronchitis last week and has increasing pain when coughing.

## 2023-12-06 NOTE — ED NOTES
Pt resting in bed. States waiting on daughter to text her so she can pick her up. Respirations easy and unlabored. Call light within reach.       Saúl Florez  12/06/23 0940

## 2023-12-06 NOTE — ED PROVIDER NOTES
[address] [facility] EMERGENCY DEPT      EMERGENCY MEDICINE     Pt Name: Kartik Minor  MRN: [id number]  [address] [facility] 1955  Date of evaluation: 12/6/2023  Provider: Maggie Martinez MD    CHIEF COMPLAINT       Chief Complaint   Patient presents with    Back Pain     HISTORY OF PRESENT ILLNESS   Kartik Minor is a pleasant 76 y.o. female who presents to the emergency department from from home, by private vehicle for evaluation of back pain. Patient states that she is currently being treated for bronchitis. She had bent over to pick something up and coughed at the same time. She felt a sharp spasm and pain in her left lower back after this. Patient states she did have a fall on Friday and she has a bruise in that area but it had not caused her pain until she had that cough and spasm. Patient states that she is having significant pain now when she coughs in her back. She denies any chest pain, or difficulty breathing. She denies any numbness or tingling down her legs. She denies any loss of bladder or bowel movements.     PASTMEDICAL HISTORY     Past Medical History:   Diagnosis Date    Arthritis     Factor V deficiency ([address] ) 2009    Hx of blood clots 2009    lungs - factor 5 diagnosed    Hyperlipidemia     Cristopher Skipper, CNP    Hypothyroidism due to acquired atrophy of thyroid 11/19/2015       Patient Active Problem List   Diagnosis Code    Tired R53.83    Aches R52    Snores R06.83    Constipation K59.00    Numbness R20.0    Personal history of PE (pulmonary embolism) Z86.711    Anticoagulant long-term use Z79.01    Hypothyroidism due to acquired atrophy of thyroid E03.4    Increased PTH level R79.89    Factor 5 Leiden mutation, heterozygous ([address]) D68.51    Impaired glucose tolerance R73.02    Osteoarthritis of hip M16.9     SURGICAL HISTORY       Past Surgical History:   Procedure Laterality Date    COLONOSCOPY      last one 2013?? states had one more recent but not sure on the year     FOOT SURGERY Left 09/24/14

## 2023-12-06 NOTE — DISCHARGE INSTRUCTIONS
You were seen for back pain. You have a significant muscle strain caused by coughing while bending over. You had relief with pain medicine and muscle relaxers. Please use heat and gentle stretches for additional leaf. I have sent Norco for severe pain. Please use naproxen and Flexeril as prescribed for mild to moderate pain. You can expect the muscles to take anywhere from 5 to 10 days to feel better. Follow-up with your primary care physician as needed.

## 2023-12-16 RX ORDER — ALBUTEROL SULFATE 90 UG/1
2 AEROSOL, METERED RESPIRATORY (INHALATION) EVERY 4 HOURS PRN
Qty: 8.5 G | OUTPATIENT
Start: 2023-12-16

## 2023-12-26 ENCOUNTER — HOSPITAL ENCOUNTER (OUTPATIENT)
Dept: PHARMACY | Age: 68
Setting detail: THERAPIES SERIES
Discharge: HOME | End: 2023-12-26
Payer: COMMERCIAL

## 2023-12-26 DIAGNOSIS — Z79.01 LONG TERM (CURRENT) USE OF ANTICOAGULANTS: Primary | ICD-10-CM

## 2023-12-26 DIAGNOSIS — D68.2 FACTOR V DEFICIENCY (HCC): ICD-10-CM

## 2023-12-26 DIAGNOSIS — Z51.81 ENCOUNTER FOR THERAPEUTIC DRUG MONITORING: ICD-10-CM

## 2023-12-26 LAB — POC INR: 1.8 (ref 0.8–1.2)

## 2023-12-26 PROCEDURE — 85610 PROTHROMBIN TIME: CPT

## 2023-12-26 PROCEDURE — 36416 COLLJ CAPILLARY BLOOD SPEC: CPT

## 2023-12-26 PROCEDURE — 99213 OFFICE O/P EST LOW 20 MIN: CPT

## 2023-12-26 RX ORDER — ENOXAPARIN SODIUM 100 MG/ML
100 INJECTION SUBCUTANEOUS 2 TIMES DAILY
Qty: 1 EACH | Refills: 0 | Status: SHIPPED | OUTPATIENT
Start: 2023-12-26 | End: 2023-12-31

## 2023-12-29 ENCOUNTER — HOSPITAL ENCOUNTER (OUTPATIENT)
Dept: PHARMACY | Age: 68
Setting detail: THERAPIES SERIES
Discharge: HOME | End: 2023-12-29
Payer: COMMERCIAL

## 2023-12-29 DIAGNOSIS — Z79.01 LONG TERM (CURRENT) USE OF ANTICOAGULANTS: Primary | ICD-10-CM

## 2023-12-29 DIAGNOSIS — D68.2 FACTOR V DEFICIENCY (HCC): ICD-10-CM

## 2023-12-29 DIAGNOSIS — Z51.81 ENCOUNTER FOR THERAPEUTIC DRUG MONITORING: ICD-10-CM

## 2023-12-29 LAB — POC INR: 2.4 (ref 0.8–1.2)

## 2023-12-29 PROCEDURE — 36416 COLLJ CAPILLARY BLOOD SPEC: CPT

## 2023-12-29 PROCEDURE — 85610 PROTHROMBIN TIME: CPT

## 2023-12-29 PROCEDURE — 99211 OFF/OP EST MAY X REQ PHY/QHP: CPT

## 2023-12-29 NOTE — PROGRESS NOTES
Medication Management [address] [place] [address]  [phone] (phone)  [phone] (fax)    Ms. Awilda Peabody is a 76 y.o.  female with history of Factor V Leiden who presents today for anticoagulation monitoring and adjustment. Patient verifies current dosing regimen and tablet strength. No missed or extra doses. Patient denies s/s bleeding/bruising/swelling/SOB  No blood in urine or stool. No dietary changes. No changes in medication/OTC agents/Herbals. She states she is still on Lovenox (last dose last night 12/28). No change in alcohol use or tobacco use. No change in activity level. No recent change in activity level, but she states she has been little less active after she had surgery (hysterectomy) on 12/15. Patient denies falls. No vomiting/diarrhea or acute illness. No Procedures scheduled in the future at this time. Assessment:   Lab Results   Component Value Date    INR 2.40 (H) 12/29/2023    INR 1.80 (H) 12/26/2023    INR 0.95 12/15/2023     INR therapeutic   Recent Labs     12/29/23  0909   INR 2.40*     Patient interview completed and discussed with pharmacist by Joyce Abbott, pharmacy intern. Per patient, has been on maintenance regimen of 5 mg daily for \"years\". States her levels have consistently been therapeutic prior to surgery on 12/15. Plan:  Stop Lovenox. Continue Coumadin 5 mg daily. Recheck INR in 1 week(s). Patient reminded to call the Anticoagulation Clinic with any signs or symptoms of bleeding or with any medication changes. Patient given instructions utilizing the teach back method. After visit summary printed and reviewed with patient. Discharged ambulatory in no apparent distress.     Dillon Son, GUY  12/29/2023 10:32 AM     For Pharmacy Admin Tracking Only    Time Spent (min): 20

## 2024-01-04 ENCOUNTER — HOSPITAL ENCOUNTER (OUTPATIENT)
Dept: PHARMACY | Age: 69
Setting detail: THERAPIES SERIES
Discharge: HOME | End: 2024-01-04
Payer: COMMERCIAL

## 2024-01-04 DIAGNOSIS — D68.2 FACTOR V DEFICIENCY (HCC): ICD-10-CM

## 2024-01-04 DIAGNOSIS — Z51.81 ENCOUNTER FOR THERAPEUTIC DRUG MONITORING: ICD-10-CM

## 2024-01-04 DIAGNOSIS — Z79.01 LONG TERM (CURRENT) USE OF ANTICOAGULANTS: Primary | ICD-10-CM

## 2024-01-04 LAB — POC INR: 2.3 (ref 0.8–1.2)

## 2024-01-04 PROCEDURE — 36416 COLLJ CAPILLARY BLOOD SPEC: CPT | Performed by: PHARMACIST

## 2024-01-04 PROCEDURE — 85610 PROTHROMBIN TIME: CPT | Performed by: PHARMACIST

## 2024-01-04 PROCEDURE — 99211 OFF/OP EST MAY X REQ PHY/QHP: CPT | Performed by: PHARMACIST

## 2024-01-25 ENCOUNTER — APPOINTMENT (OUTPATIENT)
Dept: PHARMACY | Age: 69
End: 2024-01-25
Payer: MEDICARE

## 2024-01-25 DIAGNOSIS — D68.2 FACTOR V DEFICIENCY (HCC): ICD-10-CM

## 2024-01-25 DIAGNOSIS — Z79.01 LONG TERM (CURRENT) USE OF ANTICOAGULANTS: Primary | ICD-10-CM

## 2024-01-25 DIAGNOSIS — Z51.81 ENCOUNTER FOR THERAPEUTIC DRUG MONITORING: ICD-10-CM

## 2024-01-25 LAB — POC INR: 2.8 (ref 0.8–1.2)

## 2024-01-25 PROCEDURE — 36416 COLLJ CAPILLARY BLOOD SPEC: CPT

## 2024-01-25 PROCEDURE — 99211 OFF/OP EST MAY X REQ PHY/QHP: CPT

## 2024-01-25 PROCEDURE — 85610 PROTHROMBIN TIME: CPT

## 2024-01-25 NOTE — PROGRESS NOTES
Medication Management Clinic  [facility]  Anticoagulation Clinic  [phone] (phone)  [phone] (fax)    Ms. Beverly Solares is a 68 y.o.  female with history of Factor V Leiden who presents today for anticoagulation monitoring and adjustment.    Patient verifies current dosing regimen and tablet strength.  No missed or extra doses.  Patient denies s/s bleeding/bruising/swelling/SOB  No blood in urine or stool.  No dietary changes.  No changes in medication/OTC agents/Herbals.  No change in alcohol use or tobacco use.  No change in activity level.  Patient denies falls.  No vomiting/diarrhea or acute illness.   No Procedures scheduled in the future at this time.    Assessment:   Lab Results   Component Value Date    INR 2.80 (H) 01/25/2024    INR 2.30 (H) 01/04/2024    INR 2.40 (H) 12/29/2023     INR therapeutic   Recent Labs     01/25/24  1009   INR 2.80*      Goal 2 - 3    Plan:  Continue Coumadin 5 mg daily.  Recheck INR in 3 week(s).  Patient reminded to call the Anticoagulation Clinic with any signs or symptoms of bleeding or with any medication changes.  Patient given instructions utilizing the teach back method.     After visit summary printed and reviewed with patient.      Discharged ambulatory in no apparent distress.    For Pharmacy Admin Tracking Only    Time Spent (min): 20

## 2024-02-15 ENCOUNTER — HOSPITAL ENCOUNTER (OUTPATIENT)
Dept: PHARMACY | Age: 69
Setting detail: THERAPIES SERIES
Discharge: HOME | End: 2024-02-15
Payer: COMMERCIAL

## 2024-02-15 ENCOUNTER — TELEPHONE (OUTPATIENT)
Dept: PHARMACY | Age: 69
End: 2024-02-15

## 2024-02-15 DIAGNOSIS — D68.2 FACTOR V DEFICIENCY (HCC): ICD-10-CM

## 2024-02-15 DIAGNOSIS — Z51.81 ENCOUNTER FOR THERAPEUTIC DRUG MONITORING: ICD-10-CM

## 2024-02-15 DIAGNOSIS — Z79.01 ANTICOAGULATED ON COUMADIN: ICD-10-CM

## 2024-02-15 DIAGNOSIS — D68.59 THROMBOPHILIA (HCC): ICD-10-CM

## 2024-02-15 DIAGNOSIS — Z79.01 LONG TERM (CURRENT) USE OF ANTICOAGULANTS: Primary | ICD-10-CM

## 2024-02-15 LAB — POC INR: 2.1 (ref 0.8–1.2)

## 2024-02-15 PROCEDURE — 85610 PROTHROMBIN TIME: CPT

## 2024-02-15 PROCEDURE — 36416 COLLJ CAPILLARY BLOOD SPEC: CPT

## 2024-02-15 PROCEDURE — 99212 OFFICE O/P EST SF 10 MIN: CPT

## 2024-02-15 RX ORDER — WARFARIN SODIUM 5 MG/1
TABLET ORAL EVERY EVENING
COMMUNITY

## 2024-02-15 RX ORDER — WARFARIN SODIUM 5 MG/1
TABLET ORAL
Qty: 30 TABLET | Refills: 5 | Status: SHIPPED | OUTPATIENT
Start: 2024-02-15

## 2024-02-15 NOTE — PROGRESS NOTES
Medication Management Clinic  [facility]  Anticoagulation Clinic  [phone] (phone)  [phone] (fax)    Ms. Beverly Solares is a 68 y.o.  female with history of Factor V/thrombophilia, per Dr. George's referral, who presents today for Warfarin monitoring and adjustment (3 week visit).    Patient verifies current dosing regimen and tablet strength.  No missed or extra doses.  Patient denies bleeding/bruising/swelling/SOB.  No blood in urine or stool.  No dietary changes.  No changes in medication/OTC agents/herbals.  No change in alcohol use or tobacco use.  No change in activity level.  Patient denies falls.  No vomiting/diarrhea or acute illness.   No procedures scheduled in the future at this time.    Assessment:     Lab Results   Component Value Date    INR 2.10 (H) 02/15/2024    INR 2.80 (H) 01/25/2024    INR 2.30 (H) 01/04/2024     INR therapeutic - goal 2-3.  Recent Labs     02/15/24  1056   INR 2.10*      Plan:  POCT INR performed/result reviewed.  Continue Coumadin 5 mg daily PO.  Recheck INR in 4 week(s); will see in 5 weeks, however, because she brings mother to her appt. here.  Patient reminded to call the Anticoagulation Clinic with any signs or symptoms of bleeding or with any medication changes.  Patient given instructions utilizing the teach back method.       After visit summary printed and reviewed with patient.      Discharged ambulatory in no apparent distress.  Prescription renewed electronically by clinic pharmacist.    For Pharmacy Admin Tracking Only    Intervention Detail: Refill(s) Provided  Total # of Interventions Recommended: 1  Total # of Interventions Accepted: 1  Time Spent (min):  21

## 2024-02-15 NOTE — TELEPHONE ENCOUNTER
Fax received from Pain Management.  Pt needs INR on 2/27 for procedure planned on 2/28. Fax results to [phone].

## 2024-02-16 NOTE — TELEPHONE ENCOUNTER
Called patient on 2/15  no answer  left message.    Called patient on 2/16  no answer  left message for patient to call the office to be scheduled for INR check on 2/27/24

## 2024-02-27 ENCOUNTER — APPOINTMENT (OUTPATIENT)
Dept: PHARMACY | Age: 69
End: 2024-02-27
Payer: COMMERCIAL

## 2024-02-27 DIAGNOSIS — D68.2 FACTOR V DEFICIENCY (HCC): ICD-10-CM

## 2024-02-27 DIAGNOSIS — Z79.01 ANTICOAGULATED ON COUMADIN: ICD-10-CM

## 2024-02-27 DIAGNOSIS — Z51.81 ENCOUNTER FOR THERAPEUTIC DRUG MONITORING: Primary | ICD-10-CM

## 2024-02-27 DIAGNOSIS — D68.59 THROMBOPHILIA (HCC): ICD-10-CM

## 2024-02-27 LAB — POC INR: 2.4 (ref 0.8–1.2)

## 2024-02-27 PROCEDURE — 99211 OFF/OP EST MAY X REQ PHY/QHP: CPT

## 2024-02-27 PROCEDURE — 36416 COLLJ CAPILLARY BLOOD SPEC: CPT

## 2024-02-27 PROCEDURE — 85610 PROTHROMBIN TIME: CPT

## 2024-03-04 ENCOUNTER — TELEPHONE (OUTPATIENT)
Dept: PHARMACY | Age: 69
End: 2024-03-04

## 2024-03-04 NOTE — TELEPHONE ENCOUNTER
Received fax from Dr. Shepard's office requesting INR on 3/19 in preparation for procedure on 3/20.      Fax result to [phone].

## 2024-03-19 ENCOUNTER — HOSPITAL ENCOUNTER (OUTPATIENT)
Dept: PHARMACY | Age: 69
Setting detail: THERAPIES SERIES
Discharge: HOME | End: 2024-03-19
Payer: MEDICARE

## 2024-03-19 DIAGNOSIS — Z79.01 ANTICOAGULATED ON COUMADIN: ICD-10-CM

## 2024-03-19 DIAGNOSIS — D68.59 THROMBOPHILIA (HCC): ICD-10-CM

## 2024-03-19 DIAGNOSIS — Z51.81 ENCOUNTER FOR THERAPEUTIC DRUG MONITORING: Primary | ICD-10-CM

## 2024-03-19 DIAGNOSIS — D68.2 FACTOR V DEFICIENCY (HCC): ICD-10-CM

## 2024-03-19 LAB — POC INR: 2 (ref 0.8–1.2)

## 2024-03-19 PROCEDURE — 85610 PROTHROMBIN TIME: CPT

## 2024-03-19 PROCEDURE — 99211 OFF/OP EST MAY X REQ PHY/QHP: CPT

## 2024-03-19 PROCEDURE — 36416 COLLJ CAPILLARY BLOOD SPEC: CPT

## 2024-03-19 NOTE — PROGRESS NOTES
Medication Management Clinic  [facility]  Anticoagulation Clinic  [phone] (phone)  [phone] (fax)    Ms. Beverly Solares is a 68 y.o.  female with history of Factor V/thrombophilia, per Dr. George's referral, who presents today for Warfarin monitoring and adjustment (INR pre-procedure; 3 weeks since last visit).    Patient verifies current dosing regimen and tablet strength.  No missed or extra doses.  Patient denies bleeding/swelling/SOB.  Has usual easy bruising.  No blood in urine or stool.  No dietary changes.  No changes in medication/OTC agents/herbals.  No change in alcohol use or tobacco use.  No change in activity level.  Patient denies falls.  No vomiting/diarrhea or acute illness.   Procedures scheduled in the future at this time: pain management procedure tomorrow with Dr. Shepard - no Coumadin hold (just INR day before).    Assessment:   Lab Results   Component Value Date    INR 2.00 (H) 03/19/2024    INR 2.40 (H) 02/27/2024    INR 2.10 (H) 02/15/2024     INR therapeutic - goal 2-3.  Recent Labs     03/19/24  1116   INR 2.00*        Plan:  POCT INR performed/result reviewed.  Continue Coumadin 5 mg daily PO.  Recheck INR in 5 week(s).  Patient reminded to call the Anticoagulation Clinic with any signs or symptoms of bleeding or with any medication changes.  Patient given instructions utilizing the teach back method.       After visit summary printed and reviewed with patient.      Discharged ambulatory in no apparent distress.  Today's INR given to A to fax to Dr. Shepard.    For Pharmacy Admin Tracking Only    Time Spent (min):  22

## 2024-04-04 ENCOUNTER — TRANSCRIBE ORDERS (OUTPATIENT)
Dept: ADMINISTRATIVE | Age: 69
End: 2024-04-04

## 2024-04-04 DIAGNOSIS — Z13.6 SCREENING FOR HEART DISEASE: Primary | ICD-10-CM

## 2024-04-16 ENCOUNTER — OFFICE VISIT (OUTPATIENT)
Dept: UROLOGY | Age: 69
End: 2024-04-16

## 2024-04-16 VITALS
SYSTOLIC BLOOD PRESSURE: 124 MMHG | WEIGHT: 230 LBS | DIASTOLIC BLOOD PRESSURE: 72 MMHG | BODY MASS INDEX: 36.96 KG/M2 | HEIGHT: 66 IN

## 2024-04-16 DIAGNOSIS — R32 URINARY INCONTINENCE, UNSPECIFIED TYPE: Primary | ICD-10-CM

## 2024-04-16 DIAGNOSIS — R31.29 MICROSCOPIC HEMATURIA: ICD-10-CM

## 2024-04-16 LAB — POST VOID RESIDUAL (PVR): 48 ML

## 2024-04-16 ASSESSMENT — ENCOUNTER SYMPTOMS
ABDOMINAL PAIN: 0
NAUSEA: 0
BACK PAIN: 0
VOMITING: 0

## 2024-04-16 NOTE — PROGRESS NOTES
[facility] [place] [facility]  [facility] UROLOGY  [address]  [address]  [facility] [address]  Dept: [phone]  Loc: [phone]    Visit Date: 4/16/2024        HPI:     Beverly Solares is a 68 y.o. female who presents today for:  Chief Complaint   Patient presents with    Other     Urinary incontinence  Hx Factor V Lieden and blood clots on coumadin    Follow-up     1yr follow up       HPI    Pt seen in follow up for urinary incontinence.       Gayle has a hx of urinary incontinence spanning years that worsened in 2021.  Notes episodes of complete incontinence.  Not significant frequency or urgency.  Notes \"it just happens and I don't really have control\". Had side effects with Oxybutynin.  Vesicare improved symptoms but it had side effects with a foul taste in the mouth and she had to stop it.  Had prior improvement with Myrbetriq 50 mg daily but ran out of it and changed insurance.  Now having more intermittent incontinence episodes.      Current Outpatient Medications   Medication Sig Dispense Refill    metFORMIN (GLUCOPHAGE) 500 MG tablet Take 1 tablet by mouth daily (with breakfast)      warfarin (COUMADIN) 5 MG tablet Take as directed by [facility] 30 tabs = 30 days 30 tablet 5    rosuvastatin (CRESTOR) 5 MG tablet Take 1 tablet by mouth nightly      traMADol (ULTRAM) 50 MG tablet TAKE 1 TABLET BY MOUTH EVERY 4 HOURS AS NEEDED FOR PAIN. MAX 4 TABLETS DAILY. DO NOT FILL UNTIL 10/12/2022      gabapentin (NEURONTIN) 300 MG capsule TAKE 2 CAPSULES BY MOUTH THREE TIMES DAILY. DO NOT REILL UNTIL 10/08/22      Ascorbic Acid (VITAMIN C PO) Take by mouth      ZINC PO Take by mouth daily      VITAMIN A PO Take by mouth daily      vitamin D (CHOLECALCIFEROL) 5000 UNITS CAPS capsule Take 7,500 Units by mouth daily Alternate between 5000 and 84345      albuterol sulfate HFA (PROVENTIL;VENTOLIN;PROAIR) 108 (90 Base) MCG/ACT inhaler Inhale 2 puffs into the lungs every 4 hours as

## 2024-04-25 ENCOUNTER — HOSPITAL ENCOUNTER (OUTPATIENT)
Dept: PHARMACY | Age: 69
Setting detail: THERAPIES SERIES
Discharge: HOME | End: 2024-04-25
Payer: MEDICARE

## 2024-04-25 DIAGNOSIS — Z51.81 ENCOUNTER FOR THERAPEUTIC DRUG MONITORING: Primary | ICD-10-CM

## 2024-04-25 DIAGNOSIS — Z79.01 ANTICOAGULATED ON COUMADIN: ICD-10-CM

## 2024-04-25 DIAGNOSIS — D68.59 THROMBOPHILIA (HCC): ICD-10-CM

## 2024-04-25 DIAGNOSIS — D68.2 FACTOR V DEFICIENCY (HCC): ICD-10-CM

## 2024-04-25 LAB — POC INR: 2 (ref 0.8–1.2)

## 2024-04-25 PROCEDURE — 85610 PROTHROMBIN TIME: CPT

## 2024-04-25 PROCEDURE — 99211 OFF/OP EST MAY X REQ PHY/QHP: CPT

## 2024-04-25 PROCEDURE — 36416 COLLJ CAPILLARY BLOOD SPEC: CPT

## 2024-04-25 NOTE — PROGRESS NOTES
Medication Management Clinic  [facility]  Anticoagulation Clinic  [phone] (phone)  [phone] (fax)    Ms. Beverly Solares is a 68 y.o.  female with history of Factor V/other thrombophilia, per Dr. George's referral, who presents today for Warfarin monitoring and adjustment (5 week visit).    Patient verifies current dosing regimen and tablet strength.  No missed or extra doses.  Patient denies bleeding/bruising/swelling/SOB.  No blood in urine or stool.  No dietary changes.  Changes in medication/OTC agents/herbals: now on Metformin, and back on Myrbetriq.  No change in alcohol use or tobacco use.  No change in activity level.  Patient denies falls.  No vomiting/diarrhea or acute illness.   No procedures scheduled in the future at this time.    Assessment:   Lab Results   Component Value Date    INR 2.00 (H) 04/25/2024    INR 2.00 (H) 03/19/2024    INR 2.40 (H) 02/27/2024     INR therapeutic - goal 2-3.  Recent Labs     04/25/24  1117   INR 2.00*        Plan:  POCT INR performed/result reviewed.  Continue Coumadin 5 mg daily PO.  Recheck INR in 4 week(s).  (Report given - orders received from/verified with JORGE Rios RPh., PharmD.)  Patient reminded to call the Anticoagulation Clinic with any signs or symptoms of bleeding or with any medication changes.  Patient given instructions utilizing the teach back method.       After visit summary printed and reviewed with patient.      Discharged ambulatory in no apparent distress, with mother.     For Pharmacy Admin Tracking Only    Time Spent (min):  23

## 2024-05-23 ENCOUNTER — HOSPITAL ENCOUNTER (OUTPATIENT)
Dept: PHARMACY | Age: 69
Setting detail: THERAPIES SERIES
Discharge: HOME | End: 2024-05-23
Payer: MEDICARE

## 2024-05-23 DIAGNOSIS — Z79.01 ANTICOAGULATED ON COUMADIN: ICD-10-CM

## 2024-05-23 DIAGNOSIS — Z51.81 ENCOUNTER FOR THERAPEUTIC DRUG MONITORING: Primary | ICD-10-CM

## 2024-05-23 DIAGNOSIS — D68.59 THROMBOPHILIA (HCC): ICD-10-CM

## 2024-05-23 DIAGNOSIS — D68.2 FACTOR V DEFICIENCY (HCC): ICD-10-CM

## 2024-05-23 LAB — POC INR: 3.5 (ref 0.8–1.2)

## 2024-05-23 PROCEDURE — 99212 OFFICE O/P EST SF 10 MIN: CPT

## 2024-05-23 PROCEDURE — 36416 COLLJ CAPILLARY BLOOD SPEC: CPT

## 2024-05-23 PROCEDURE — 85610 PROTHROMBIN TIME: CPT

## 2024-05-23 NOTE — PROGRESS NOTES
Medication Management Clinic  [facility]  Anticoagulation Clinic  [phone] (phone)  [phone] (fax)    Ms. Beverly Solares is a 68 y.o.  female with history of Factor V/thrombophilia, per Dr. George's referral, who presents today for Warfarin monitoring and adjustment (4 week visit).    Patient verifies current dosing regimen and tablet strength.  No missed or extra doses.  Patient denies bleeding/swelling/SOB.  Has usual easy bruising.  No blood in urine or stool.  No dietary changes.  No changes in medication/OTC agents/herbals.  No change in alcohol use or tobacco use.  No change in activity level.  Patient denies falls.  No vomiting/diarrhea or acute illness.   No procedures scheduled in the future at this time.    Assessment:   Lab Results   Component Value Date    INR 3.50 (H) 05/23/2024    INR 2.00 (H) 04/25/2024    INR 2.00 (H) 03/19/2024     INR supratherapeutic - goal 2-3.  Recent Labs     05/23/24  1120   INR 3.50*        Plan:  POCT INR performed/result reviewed.  2.5 mg today and tomorrow, per policy, then continue Coumadin 5 mg daily PO.  Recheck INR in 2 week(s), per policy.  Patient reminded to call the Anticoagulation Clinic with any signs or symptoms of bleeding or with any medication changes.  Patient given instructions utilizing the teach back method.       After visit summary printed and reviewed with patient.    Advised extra caution.  Discharged ambulatory in no apparent distress, with mother.     For Pharmacy Admin Tracking Only    Intervention Detail: Dose Adjustment: 1, reason: Therapy De-escalation  Total # of Interventions Recommended: 1  Total # of Interventions Accepted: 1  Time Spent (min): 20

## 2024-06-05 ENCOUNTER — HOSPITAL ENCOUNTER (OUTPATIENT)
Dept: PHARMACY | Age: 69
Setting detail: THERAPIES SERIES
Discharge: HOME | End: 2024-06-05
Payer: MEDICARE

## 2024-06-05 VITALS — TEMPERATURE: 98 F

## 2024-06-05 DIAGNOSIS — D68.2 FACTOR V DEFICIENCY (HCC): ICD-10-CM

## 2024-06-05 DIAGNOSIS — Z51.81 ENCOUNTER FOR THERAPEUTIC DRUG MONITORING: Primary | ICD-10-CM

## 2024-06-05 DIAGNOSIS — Z79.01 ANTICOAGULATED ON COUMADIN: ICD-10-CM

## 2024-06-05 DIAGNOSIS — D68.59 THROMBOPHILIA (HCC): ICD-10-CM

## 2024-06-05 LAB — POC INR: 2.3 (ref 0.8–1.2)

## 2024-06-05 PROCEDURE — 85610 PROTHROMBIN TIME: CPT

## 2024-06-05 PROCEDURE — 99211 OFF/OP EST MAY X REQ PHY/QHP: CPT

## 2024-06-05 PROCEDURE — 36416 COLLJ CAPILLARY BLOOD SPEC: CPT

## 2024-06-05 RX ORDER — ACETAMINOPHEN 500 MG
500 TABLET ORAL EVERY 6 HOURS PRN
COMMUNITY

## 2024-06-05 NOTE — PROGRESS NOTES
Medication Management Clinic  [facility]  Anticoagulation Clinic  [phone] (phone)  [phone] (fax)    Ms. Beverly Solares is a 68 y.o.  female with history of Factor V/other thrombophilia, per Dr. George's referral, who presents today for Warfarin monitoring and adjustment (2 week visit).    Patient verifies current dosing regimen and tablet strength.  No missed or extra doses.  Patient denies bleeding/swelling/SOB. Has usual easy bruising.  No blood in urine or stool.  No dietary changes.  No changes in medication/OTC agents/herbals.  No change in alcohol use or tobacco use.  No change in activity level.  Patient denies falls.  No vomiting/diarrhea or acute illness. Using some Tylenol for a cold.  Temp. 98 degrees temporally.  No procedures scheduled in the future at this time.    Assessment:   Lab Results   Component Value Date    INR 2.30 (H) 06/05/2024    INR 3.50 (H) 05/23/2024    INR 2.00 (H) 04/25/2024     INR therapeutic - goal 2-3.  Recent Labs     06/05/24  1053   INR 2.30*        Plan:  POCT INR performed/result reviewed.  Continue Coumadin 5 mg daily PO.  Recheck INR in 2 week(s) - already scheduled (same day as mother's appt.).  Patient reminded to call the Anticoagulation Clinic with any signs or symptoms of bleeding or with any medication changes.  Patient given instructions utilizing the teach back method.       After visit summary printed and reviewed with patient.      Discharged ambulatory in no apparent distress.     For Pharmacy Admin Tracking Only    Time Spent (min): 20

## 2024-06-19 ENCOUNTER — HOSPITAL ENCOUNTER (OUTPATIENT)
Dept: PHARMACY | Age: 69
Setting detail: THERAPIES SERIES
Discharge: HOME | End: 2024-06-19
Payer: MEDICARE

## 2024-06-19 DIAGNOSIS — D68.59 THROMBOPHILIA (HCC): ICD-10-CM

## 2024-06-19 DIAGNOSIS — Z51.81 ENCOUNTER FOR THERAPEUTIC DRUG MONITORING: Primary | ICD-10-CM

## 2024-06-19 DIAGNOSIS — Z79.01 ANTICOAGULATED ON COUMADIN: ICD-10-CM

## 2024-06-19 DIAGNOSIS — D68.2 FACTOR V DEFICIENCY (HCC): ICD-10-CM

## 2024-06-19 LAB — POC INR: 2.8 (ref 0.8–1.2)

## 2024-06-19 PROCEDURE — 36416 COLLJ CAPILLARY BLOOD SPEC: CPT

## 2024-06-19 PROCEDURE — 99211 OFF/OP EST MAY X REQ PHY/QHP: CPT

## 2024-06-19 PROCEDURE — 85610 PROTHROMBIN TIME: CPT

## 2024-06-19 NOTE — PROGRESS NOTES
Medication Management Clinic  [facility]  Anticoagulation Clinic  [phone] (phone)  [phone] (fax)    Ms. Beverly Solares is a 68 y.o.  female with history of Factor V/other thrombophilia, per Dr. George's referral, who presents today for Warfarin monitoring and adjustment (2 week visit).    Patient verifies current dosing regimen and tablet strength.  No missed or extra doses.  Patient denies bleeding/swelling/SOB.  Has usual easy bruising (dropped something on foot recently).  No blood in urine or stool.  No dietary changes.  Changes in medication/OTC agents/herbals: started Allegra.  No change in alcohol use or tobacco use.  No change in activity level.  Patient denies falls.  No vomiting/diarrhea or acute illness.   No procedures scheduled in the future at this time.    Assessment:   Lab Results   Component Value Date    INR 2.80 (H) 06/19/2024    INR 2.30 (H) 06/05/2024    INR 3.50 (H) 05/23/2024     INR therapeutic - goal 2-3.  Recent Labs     06/19/24  1121   INR 2.80*        Plan:  POCT INR performed/result reviewed.  Continue Coumadin 5 mg daily PO.  Recheck INR in 3 week(s).  Patient reminded to call the Anticoagulation Clinic with any signs or symptoms of bleeding or with any medication changes.  Patient given instructions utilizing the teach back method.       After visit summary printed and reviewed with patient.      Discharged ambulatory in no apparent distress, with mother.     For Pharmacy Admin Tracking Only    Time Spent (min): 20

## 2024-07-10 ENCOUNTER — HOSPITAL ENCOUNTER (OUTPATIENT)
Dept: PHARMACY | Age: 69
Setting detail: THERAPIES SERIES
Discharge: HOME | End: 2024-07-10
Payer: MEDICARE

## 2024-07-10 DIAGNOSIS — D68.59 THROMBOPHILIA (HCC): ICD-10-CM

## 2024-07-10 DIAGNOSIS — Z51.81 ENCOUNTER FOR THERAPEUTIC DRUG MONITORING: Primary | ICD-10-CM

## 2024-07-10 DIAGNOSIS — Z79.01 ANTICOAGULATED ON COUMADIN: ICD-10-CM

## 2024-07-10 DIAGNOSIS — D68.2 FACTOR V DEFICIENCY (HCC): ICD-10-CM

## 2024-07-10 LAB — POC INR: 2.3 (ref 0.8–1.2)

## 2024-07-10 PROCEDURE — 85610 PROTHROMBIN TIME: CPT

## 2024-07-10 PROCEDURE — 99212 OFFICE O/P EST SF 10 MIN: CPT

## 2024-07-10 PROCEDURE — 36416 COLLJ CAPILLARY BLOOD SPEC: CPT

## 2024-07-10 RX ORDER — WARFARIN SODIUM 5 MG/1
TABLET ORAL
Qty: 30 TABLET | Refills: 11 | Status: SHIPPED | OUTPATIENT
Start: 2024-07-10

## 2024-07-10 NOTE — PROGRESS NOTES
Medication Management Clinic  [facility]  Anticoagulation Clinic  [phone] (phone)  [phone] (fax)    Ms. Beverly Solares is a 69 y.o.  female with history of Factor V/other thrombophilia, per Dr. George's referral, who presents today for Warfarin monitoring and adjustment (3 week visit).    Patient verifies current dosing regimen and tablet strength.  No missed or extra doses.  Patient denies bleeding/swelling/SOB.  Has usual easy bruising.  No blood in urine or stool.  No dietary changes.  No changes in medication/OTC agents/herbals.  No change in alcohol use or tobacco use.  No change in activity level.  Patient denies falls.  No vomiting/diarrhea or acute illness.   No procedures scheduled in the future at this time.    Assessment:   Lab Results   Component Value Date    INR 2.30 (H) 07/10/2024    INR 2.80 (H) 06/19/2024    INR 2.30 (H) 06/05/2024     INR therapeutic - goal 2-3.  Recent Labs     07/10/24  1102   INR 2.30*        Plan:  POCT INR performed/result reviewed.  Continue Coumadin 5 mg daily PO.  Recheck INR in 4 week(s) - out of town in 5.  Patient reminded to call the Anticoagulation Clinic with any signs or symptoms of bleeding or with any medication changes.  Patient given instructions utilizing the teach back method.       After visit summary printed and reviewed with patient.      Discharged ambulatory in no apparent distress, with mother.  Prescription renewed electronically by clinic pharmacist.    For Pharmacy Admin Tracking Only    Intervention Detail: Refill(s) Provided  Total # of Interventions Recommended: 1  Total # of Interventions Accepted: 1  Time Spent (min):  21

## 2024-07-16 ENCOUNTER — OFFICE VISIT (OUTPATIENT)
Dept: UROLOGY | Age: 69
End: 2024-07-16
Payer: MEDICARE

## 2024-07-16 VITALS
BODY MASS INDEX: 36.69 KG/M2 | WEIGHT: 228.3 LBS | DIASTOLIC BLOOD PRESSURE: 86 MMHG | HEIGHT: 66 IN | SYSTOLIC BLOOD PRESSURE: 124 MMHG

## 2024-07-16 DIAGNOSIS — R32 URINARY INCONTINENCE, UNSPECIFIED TYPE: Primary | ICD-10-CM

## 2024-07-16 LAB
BILIRUBIN, URINE: NEGATIVE
BLOOD URINE, POC: NEGATIVE
CHARACTER, URINE: CLEAR
COLOR, UA: YELLOW
GLUCOSE URINE: NEGATIVE MG/DL
KETONES, URINE: NEGATIVE
LEUKOCYTE CLUMPS, URINE: NEGATIVE
NITRITE, URINE: NEGATIVE
PH, URINE: 6 (ref 5–9)
POST VOID RESIDUAL (PVR): 68 ML
PROTEIN, URINE: NEGATIVE MG/DL
SPECIFIC GRAVITY UA: 1.01 (ref 1–1.03)
UROBILINOGEN, URINE: 0.2 EU/DL (ref 0–1)

## 2024-07-16 PROCEDURE — 81003 URINALYSIS AUTO W/O SCOPE: CPT | Performed by: NURSE PRACTITIONER

## 2024-07-16 PROCEDURE — 99214 OFFICE O/P EST MOD 30 MIN: CPT | Performed by: NURSE PRACTITIONER

## 2024-07-16 PROCEDURE — 51798 US URINE CAPACITY MEASURE: CPT | Performed by: NURSE PRACTITIONER

## 2024-07-16 PROCEDURE — 1123F ACP DISCUSS/DSCN MKR DOCD: CPT | Performed by: NURSE PRACTITIONER

## 2024-07-16 ASSESSMENT — ENCOUNTER SYMPTOMS
ABDOMINAL PAIN: 0
NAUSEA: 0
VOMITING: 0
BACK PAIN: 0

## 2024-07-16 NOTE — PROGRESS NOTES
gabapentin (NEURONTIN) 300 MG capsule TAKE 2 CAPSULES BY MOUTH THREE TIMES DAILY. DO NOT REILL UNTIL 10/08/22      Ascorbic Acid (VITAMIN C PO) Take by mouth      ZINC PO Take by mouth daily      VITAMIN A PO Take by mouth daily      vitamin D (CHOLECALCIFEROL) 5000 UNITS CAPS capsule Take 7,500 Units by mouth daily Alternate between 5000 and 73323      mirabegron (MYRBETRIQ) 50 MG TB24 Take 50 mg by mouth daily 28 tablet 0     No current facility-administered medications for this visit.       Past Medical History  Beverly  has a past medical history of Arthritis, Factor V deficiency (HCC), Hx of blood clots, Hyperlipidemia, and Hypothyroidism due to acquired atrophy of thyroid.    Past Surgical History  The patient  has a past surgical history that includes Toe Surgery (2004??); Tubal ligation (1996); Colonoscopy; Foot surgery (Left, 09/24/2014); joint replacement (2009/2018); and other surgical history (2023).    Family History  This patient's family history includes COPD in her father; Cancer in her father and paternal grandmother; Heart Attack in her paternal grandfather; Heart Disease in her paternal grandfather; High Blood Pressure in her father and mother; High Cholesterol in her mother; Other in her maternal grandmother, mother, and sister; Stroke in her maternal grandfather.    Social History  Beverly  reports that she has never smoked. She has never used smokeless tobacco. She reports current alcohol use. She reports that she does not use drugs.      Subjective:      Review of Systems   Constitutional:  Negative for activity change, appetite change, chills, diaphoresis, fatigue, fever and unexpected weight change.   Gastrointestinal:  Negative for abdominal pain, nausea and vomiting.   Genitourinary:  Positive for urgency. Negative for decreased urine volume, difficulty urinating, dysuria, flank pain, frequency and hematuria.   Musculoskeletal:  Negative for back pain.       Objective:   /86   Ht

## 2024-08-07 ENCOUNTER — ANTI-COAG VISIT (OUTPATIENT)
Age: 69
End: 2024-08-07
Payer: MEDICARE

## 2024-08-07 DIAGNOSIS — Z79.01 ANTICOAGULATED ON COUMADIN: ICD-10-CM

## 2024-08-07 DIAGNOSIS — D68.59 THROMBOPHILIA (HCC): ICD-10-CM

## 2024-08-07 DIAGNOSIS — Z51.81 ENCOUNTER FOR THERAPEUTIC DRUG MONITORING: Primary | ICD-10-CM

## 2024-08-07 DIAGNOSIS — D68.2 FACTOR V DEFICIENCY (HCC): ICD-10-CM

## 2024-08-07 LAB — POC INR: 2.5 (ref 0.8–1.2)

## 2024-08-07 PROCEDURE — 99211 OFF/OP EST MAY X REQ PHY/QHP: CPT

## 2024-08-07 PROCEDURE — 85610 PROTHROMBIN TIME: CPT

## 2024-08-07 RX ORDER — ROSUVASTATIN CALCIUM 10 MG/1
10 TABLET, COATED ORAL NIGHTLY
COMMUNITY
Start: 2024-07-02

## 2024-08-07 NOTE — PROGRESS NOTES
Medication Management Clinic  [facility]  Anticoagulation Clinic  [phone] (phone)  [phone] (fax)    Ms. Beverly Solares is a 69 y.o.  female with history of Factor V/other thrombophilia, per Dr. George's referral, who presents today for Warfarin monitoring and adjustment (4 week visit).    Patient verifies current dosing regimen and tablet strength.  No missed or extra doses.  Patient denies bleeding/swelling/SOB.  Has usual easy bruising.  No blood in urine or stool.  No dietary changes.  No changes in medication/OTC agents/herbals. Updated medication list for higher dose of Crestor, but not new since last visit.  No change in alcohol use or tobacco use.  No change in activity level.  Patient denies falls.  No vomiting/diarrhea or acute illness.   No procedures scheduled in the future at this time.    Assessment:   Lab Results   Component Value Date    INR 2.50 (H) 08/07/2024    INR 2.30 (H) 07/10/2024    INR 2.80 (H) 06/19/2024     INR therapeutic - goal 2-3.  Recent Labs     08/07/24  1058   INR 2.50*        Plan:  POCT INR performed/result reviewed.  Continue Coumadin 5 mg daily PO.  Recheck INR in 4 week(s).  Patient reminded to call the Anticoagulation Clinic with any signs or symptoms of bleeding or with any medication changes.  Patient given instructions utilizing the teach back method.       After visit summary printed and reviewed with patient.      Discharged ambulatory in no apparent distress, with mother.     For Pharmacy Admin Tracking Only    Time Spent (min): 20

## 2024-09-04 ENCOUNTER — ANTI-COAG VISIT (OUTPATIENT)
Age: 69
End: 2024-09-04
Payer: MEDICARE

## 2024-09-04 DIAGNOSIS — D68.2 FACTOR V DEFICIENCY (HCC): ICD-10-CM

## 2024-09-04 DIAGNOSIS — D68.59 THROMBOPHILIA (HCC): ICD-10-CM

## 2024-09-04 DIAGNOSIS — Z79.01 ANTICOAGULATED ON COUMADIN: ICD-10-CM

## 2024-09-04 DIAGNOSIS — Z51.81 ENCOUNTER FOR THERAPEUTIC DRUG MONITORING: Primary | ICD-10-CM

## 2024-09-04 LAB — POC INR: 1.9 (ref 0.8–1.2)

## 2024-09-04 PROCEDURE — 99211 OFF/OP EST MAY X REQ PHY/QHP: CPT

## 2024-09-04 PROCEDURE — 85610 PROTHROMBIN TIME: CPT

## 2024-09-04 RX ORDER — MECLIZINE HYDROCHLORIDE 25 MG/1
25 TABLET ORAL EVERY 6 HOURS PRN
COMMUNITY
Start: 2024-08-05

## 2024-09-04 NOTE — PROGRESS NOTES
Medication Management Clinic  [facility]  Anticoagulation Clinic  [phone] (phone)  [phone] (fax)    Ms. Beverly Solares is a 69 y.o.  female with history of Factor V/other thrombophilia, per Dr. George's referral, who presents today for Warfarin monitoring and adjustment (4 week visit).    Patient verifies current dosing regimen and tablet strength.  No missed or extra doses.  Patient denies bleeding/swelling/SOB.  Has usual easy bruising.  No blood in urine or stool.  No dietary changes.  No changes in medication/OTC agents/herbals.  No change in alcohol use or tobacco use.  No change in activity level.  Patient denies falls.  No vomiting/diarrhea or acute illness.   No procedures scheduled in the future at this time.    Assessment:   Lab Results   Component Value Date    INR 1.90 (H) 09/04/2024    INR 2.50 (H) 08/07/2024    INR 2.30 (H) 07/10/2024     INR subtherapeutic - goal 2-3.  Recent Labs     09/04/24  1133   INR 1.90*        Plan:  POCT INR performed/result reviewed.  7.5 mg today, per policy, then continue Coumadin 5 mg daily PO.  Recheck INR in 3 week(s), per policy.  Patient reminded to call the Anticoagulation Clinic with any signs or symptoms of bleeding or with any medication changes.  Patient given instructions utilizing the teach back method.       After visit summary printed and reviewed with patient.      Discharged ambulatory in no apparent distress, with mother.     For Pharmacy Admin Tracking Only    Intervention Detail: Dose Adjustment: 1, reason: Therapy Optimization  Total # of Interventions Recommended: 1  Total # of Interventions Accepted: 1  Time Spent (min): 20

## 2024-09-25 ENCOUNTER — ANTI-COAG VISIT (OUTPATIENT)
Age: 69
End: 2024-09-25
Payer: MEDICARE

## 2024-09-25 DIAGNOSIS — D68.59 THROMBOPHILIA (HCC): ICD-10-CM

## 2024-09-25 DIAGNOSIS — D68.2 FACTOR V DEFICIENCY (HCC): ICD-10-CM

## 2024-09-25 DIAGNOSIS — Z51.81 ENCOUNTER FOR THERAPEUTIC DRUG MONITORING: Primary | ICD-10-CM

## 2024-09-25 DIAGNOSIS — Z79.01 ANTICOAGULATED ON COUMADIN: ICD-10-CM

## 2024-09-25 LAB — POC INR: 2.1 (ref 0.8–1.2)

## 2024-09-25 PROCEDURE — 85610 PROTHROMBIN TIME: CPT | Performed by: PHARMACIST

## 2024-09-25 PROCEDURE — 99211 OFF/OP EST MAY X REQ PHY/QHP: CPT | Performed by: PHARMACIST

## 2024-10-23 ENCOUNTER — ANTI-COAG VISIT (OUTPATIENT)
Age: 69
End: 2024-10-23
Payer: MEDICARE

## 2024-10-23 DIAGNOSIS — Z79.01 ANTICOAGULATED ON COUMADIN: ICD-10-CM

## 2024-10-23 DIAGNOSIS — D68.2 FACTOR V DEFICIENCY (HCC): ICD-10-CM

## 2024-10-23 DIAGNOSIS — Z51.81 ENCOUNTER FOR THERAPEUTIC DRUG MONITORING: Primary | ICD-10-CM

## 2024-10-23 DIAGNOSIS — D68.59 THROMBOPHILIA (HCC): ICD-10-CM

## 2024-10-23 LAB — POC INR: 2.2 (ref 0.8–1.2)

## 2024-10-23 PROCEDURE — 99211 OFF/OP EST MAY X REQ PHY/QHP: CPT

## 2024-10-23 PROCEDURE — 85610 PROTHROMBIN TIME: CPT

## 2024-10-23 NOTE — PROGRESS NOTES
Medication Management Clinic  [facility]  Anticoagulation Clinic  [phone] (phone)  [phone] (fax)    Ms. Beverly Solares is a 69 y.o.  female with history of Factor V/other thrombophilia, per Dr. George's referral, who presents today for Warfarin monitoring and adjustment (4 week visit).    Patient verifies current dosing regimen and tablet strength.  No missed or extra doses.  Patient denies bleeding/swelling/SOB.  Has usual easy bruising.  No blood in urine or stool.  No dietary changes.  No changes in medication/OTC agents/herbals.  No change in alcohol use or tobacco use.  No change in activity level.  Patient denies falls.  No vomiting/diarrhea or acute illness.   No procedures scheduled in the future at this time.    Assessment:     Lab Results   Component Value Date    INR 2.20 (H) 10/23/2024    INR 2.10 (H) 09/25/2024    INR 1.90 (H) 09/04/2024     INR therapeutic - goal 2-3.  Recent Labs     10/23/24  1353   INR 2.20*      Plan:  POCT INR performed/result reviewed.  Continue Coumadin 5 mg daily PO.  Recheck INR in 4 week(s).  Patient reminded to call the Anticoagulation Clinic with any signs or symptoms of bleeding or with any medication changes.  Patient given instructions utilizing the teach back method.       After visit summary printed and reviewed with patient.      Discharged ambulatory in no apparent distress, with mother.     For Pharmacy Admin Tracking Only    Time Spent (min): 20

## 2024-11-19 ENCOUNTER — TELEPHONE (OUTPATIENT)
Age: 69
End: 2024-11-19

## 2024-11-19 DIAGNOSIS — Z79.01 ANTICOAGULATED ON COUMADIN: Primary | ICD-10-CM

## 2024-11-19 DIAGNOSIS — D68.59 THROMBOPHILIA (HCC): ICD-10-CM

## 2024-11-19 DIAGNOSIS — D68.2 FACTOR V DEFICIENCY (HCC): ICD-10-CM

## 2024-11-19 NOTE — TELEPHONE ENCOUNTER
Called Dr. George's office for CBC.  They have not seen patient since last year - no future visit.

## 2024-11-20 ENCOUNTER — ANTI-COAG VISIT (OUTPATIENT)
Age: 69
End: 2024-11-20
Payer: MEDICARE

## 2024-11-20 DIAGNOSIS — Z51.81 ENCOUNTER FOR THERAPEUTIC DRUG MONITORING: Primary | ICD-10-CM

## 2024-11-20 DIAGNOSIS — Z79.01 ANTICOAGULATED ON COUMADIN: ICD-10-CM

## 2024-11-20 DIAGNOSIS — D68.59 THROMBOPHILIA (HCC): ICD-10-CM

## 2024-11-20 DIAGNOSIS — D68.2 FACTOR V DEFICIENCY (HCC): ICD-10-CM

## 2024-11-20 LAB — POC INR: 2 (ref 0.8–1.2)

## 2024-11-20 PROCEDURE — 99211 OFF/OP EST MAY X REQ PHY/QHP: CPT

## 2024-11-20 PROCEDURE — 85610 PROTHROMBIN TIME: CPT

## 2024-11-20 NOTE — PROGRESS NOTES
Medication Management Clinic  [facility]  Anticoagulation Clinic  [phone] (phone)  [phone] (fax)    Ms. Beverly Solares is a 69 y.o.  female with history of Factor V/other thrombophilia, per Dr. George's referral, who presents today for Warfarin monitoring and adjustment (4 week visit).    Patient verifies current dosing regimen and tablet strength.  No missed or extra doses.  Patient denies bleeding/bruising/swelling/SOB.  No blood in urine or stool.  No dietary changes.  No changes in medication/OTC agents/herbals.  No change in alcohol use or tobacco use.  No change in activity level. Has had high stress level for 2 weeks.  Patient denies falls.  No vomiting/diarrhea or acute illness.   No procedures scheduled in the future at this time.    Assessment:   Lab Results   Component Value Date    INR 2.00 (H) 11/20/2024    INR 2.20 (H) 10/23/2024    INR 2.10 (H) 09/25/2024     INR therapeutic - goal 2-3.  Recent Labs     11/20/24  1404   INR 2.00*        Plan:  POCT INR performed/result reviewed.  Continue Coumadin 5 mg daily PO.  Recheck INR in 4 week(s).  Patient reminded to call the Anticoagulation Clinic with any signs or symptoms of bleeding or with any medication changes.  Patient given instructions utilizing the teach back method.       After visit summary printed and reviewed with patient.    Given order for routine CBC due yet this year; has other labs to do for PCP.  Discharged ambulatory in no apparent distress.     For Pharmacy Admin Tracking Only    Intervention Detail: Lab(s) Ordered  Total # of Interventions Recommended: 1  Total # of Interventions Accepted: 1  Time Spent (min):  21

## 2024-12-18 ENCOUNTER — ANTI-COAG VISIT (OUTPATIENT)
Age: 69
End: 2024-12-18
Payer: MEDICARE

## 2024-12-18 DIAGNOSIS — D68.2 FACTOR V DEFICIENCY (HCC): ICD-10-CM

## 2024-12-18 DIAGNOSIS — Z86.711 PERSONAL HISTORY OF PE (PULMONARY EMBOLISM): ICD-10-CM

## 2024-12-18 DIAGNOSIS — Z51.81 ENCOUNTER FOR THERAPEUTIC DRUG MONITORING: Primary | ICD-10-CM

## 2024-12-18 DIAGNOSIS — Z79.01 ANTICOAGULATED ON COUMADIN: ICD-10-CM

## 2024-12-18 DIAGNOSIS — D68.59 THROMBOPHILIA (HCC): ICD-10-CM

## 2024-12-18 LAB — POC INR: 1.8 (ref 0.8–1.2)

## 2024-12-18 PROCEDURE — 99211 OFF/OP EST MAY X REQ PHY/QHP: CPT

## 2024-12-18 PROCEDURE — 85610 PROTHROMBIN TIME: CPT

## 2024-12-18 NOTE — PROGRESS NOTES
Medication Management Clinic  [facility]  Anticoagulation Clinic  [phone] (phone)  [phone] (fax)    Ms. Beverly Solares is a 69 y.o.  female with history of Factor V/other thrombophilia/PE, per Dr. George's referral, who presents today for Warfarin monitoring and adjustment (4 week visit).    Patient verifies current dosing regimen and tablet strength.  No extra doses. Missed last night's dose ( was in hospital).  Patient denies bleeding/swelling/SOB.  Has usual easy bruising.  No blood in urine or stool.  No dietary changes.  No changes in medication/OTC agents/herbals.  No change in alcohol use or tobacco use.  No change in activity level. Has a little more stress.  Patient denies falls.  No vomiting/diarrhea or acute illness.   No procedures scheduled in the future at this time.    Assessment:   Lab Results   Component Value Date    INR 1.80 (H) 12/18/2024    INR 2.00 (H) 11/20/2024    INR 2.20 (H) 10/23/2024     INR subtherapeutic - goal 2-3.  Recent Labs     12/18/24  1350   INR 1.80*        Plan:  POCT INR performed/result reviewed.  7.5 mg today, then continue Coumadin 5 mg daily PO.  Recheck INR in 4 week(s). (Report given - orders entered by JANE Stokes HCA HealthcareRobbie, PharmD.)  Patient reminded to call the Anticoagulation Clinic with any signs or symptoms of bleeding or with any medication changes.  Patient given instructions utilizing the teach back method.     After visit summary printed and reviewed with patient.    Again given order for routine CBC to do with labs for PCP.  Discharged ambulatory in no apparent distress, with mother.     For Pharmacy Admin Tracking Only    Intervention Detail: Dose Adjustment: 1, reason: Therapy Optimization  Total # of Interventions Recommended: 1  Total # of Interventions Accepted: 1  Time Spent (min):  22

## 2025-01-08 ENCOUNTER — ANTI-COAG VISIT (OUTPATIENT)
Age: 70
End: 2025-01-08
Payer: MEDICARE

## 2025-01-08 DIAGNOSIS — D68.59 THROMBOPHILIA (HCC): ICD-10-CM

## 2025-01-08 DIAGNOSIS — Z86.711 PERSONAL HISTORY OF PE (PULMONARY EMBOLISM): Primary | ICD-10-CM

## 2025-01-08 DIAGNOSIS — D68.2 FACTOR V DEFICIENCY (HCC): ICD-10-CM

## 2025-01-08 DIAGNOSIS — Z79.01 ANTICOAGULATED ON COUMADIN: ICD-10-CM

## 2025-01-08 DIAGNOSIS — Z51.81 ENCOUNTER FOR THERAPEUTIC DRUG MONITORING: ICD-10-CM

## 2025-01-08 LAB — POC INR: 2.1 (ref 0.8–1.2)

## 2025-01-08 PROCEDURE — 99211 OFF/OP EST MAY X REQ PHY/QHP: CPT

## 2025-01-08 PROCEDURE — 85610 PROTHROMBIN TIME: CPT

## 2025-01-08 NOTE — PROGRESS NOTES
Medication Management Clinic  [facility]  Anticoagulation Clinic  [phone] (phone)  [phone] (fax)    Ms. Beverly Solares is a 69 y.o.  female with history of PE, Factor V Leiden who presents today for anticoagulation monitoring and adjustment.    Patient verifies current dosing regimen and tablet strength.  No missed or extra doses.  Patient denies s/s bleeding/bruising/swelling/SOB  No blood in urine or stool.  No dietary changes.  Removed albuterol inhaler and meclizine from medication list, no longer using  No change in alcohol use or tobacco use.  No change in activity level.  Patient denies falls.  No vomiting/diarrhea or acute illness.   No Procedures scheduled in the future at this time.    Assessment:   Lab Results   Component Value Date    INR 2.10 (H) 01/08/2025    INR 1.80 (H) 12/18/2024    INR 2.00 (H) 11/20/2024     INR therapeutic   Recent Labs     01/08/25  1359   INR 2.10*     Plan:  Continue Coumadin 5 mg every day.  Recheck INR in 4 week(s).  Reminded patient of pending CBC ordered. Patient reminded to call the Anticoagulation Clinic with any signs or symptoms of bleeding or with any medication changes.  Patient given instructions utilizing the teach back method.        After visit summary printed and reviewed with patient.      Discharged ambulatory in no apparent distress.    For Pharmacy Admin Tracking Only    Time Spent (min): 20

## 2025-02-05 ENCOUNTER — ANTI-COAG VISIT (OUTPATIENT)
Age: 70
End: 2025-02-05
Payer: MEDICARE

## 2025-02-05 DIAGNOSIS — D68.59 THROMBOPHILIA (HCC): ICD-10-CM

## 2025-02-05 DIAGNOSIS — Z79.01 ANTICOAGULATED ON COUMADIN: ICD-10-CM

## 2025-02-05 DIAGNOSIS — D68.2 FACTOR V DEFICIENCY (HCC): ICD-10-CM

## 2025-02-05 DIAGNOSIS — Z86.711 PERSONAL HISTORY OF PE (PULMONARY EMBOLISM): Primary | ICD-10-CM

## 2025-02-05 DIAGNOSIS — Z51.81 ENCOUNTER FOR THERAPEUTIC DRUG MONITORING: ICD-10-CM

## 2025-02-05 LAB — POC INR: 2.2 (ref 0.8–1.2)

## 2025-02-05 PROCEDURE — 99211 OFF/OP EST MAY X REQ PHY/QHP: CPT

## 2025-02-05 PROCEDURE — 85610 PROTHROMBIN TIME: CPT

## 2025-02-05 NOTE — PROGRESS NOTES
Medication Management Clinic  [facility]  Anticoagulation Clinic  [phone] (phone)  [phone] (fax)    Ms. Beverly Solares is a 69 y.o.  female with history of PE/Factor V/other thrombophilia, per Dr. George's referral, who presents today for Warfarin monitoring and adjustment (4 week visit).    Patient verifies current dosing regimen and tablet strength.  No missed or extra doses.  Patient denies bleeding/swelling/SOB. Has usual easy bruising.  No blood in urine or stool.  No dietary changes.  No changes in medication/OTC agents/herbals.  No change in alcohol use or tobacco use.  No change in activity level.  Patient denies falls.  No vomiting/diarrhea or acute illness.   No procedures scheduled in the future at this time.    Assessment:   Lab Results   Component Value Date    INR 2.20 (H) 02/05/2025    INR 2.10 (H) 01/08/2025    INR 1.80 (H) 12/18/2024     INR therapeutic - goal 2-3.  Recent Labs     02/05/25  1351   INR 2.20*        Plan:  POCT INR performed/result reviewed.  Continue Coumadin 5 mg daily PO.  Recheck INR in 4 week(s).  Patient reminded to call the Anticoagulation Clinic with any signs or symptoms of bleeding or with any medication changes.  Patient given instructions utilizing the teach back method.       After visit summary printed and reviewed with patient.    Still has order for routine CBC.  Discharged ambulatory in no apparent distress, with mother.     For Pharmacy Admin Tracking Only    Time Spent (min): 20

## 2025-03-05 ENCOUNTER — ANTI-COAG VISIT (OUTPATIENT)
Age: 70
End: 2025-03-05
Payer: MEDICARE

## 2025-03-05 DIAGNOSIS — Z86.711 PERSONAL HISTORY OF PE (PULMONARY EMBOLISM): Primary | ICD-10-CM

## 2025-03-05 DIAGNOSIS — D68.59 THROMBOPHILIA: ICD-10-CM

## 2025-03-05 DIAGNOSIS — Z51.81 ENCOUNTER FOR THERAPEUTIC DRUG MONITORING: ICD-10-CM

## 2025-03-05 DIAGNOSIS — Z79.01 ANTICOAGULATED ON COUMADIN: ICD-10-CM

## 2025-03-05 DIAGNOSIS — D68.2 FACTOR V DEFICIENCY (HCC): ICD-10-CM

## 2025-03-05 LAB — POC INR: 2.6 (ref 0.8–1.2)

## 2025-03-05 PROCEDURE — 85610 PROTHROMBIN TIME: CPT | Performed by: PHARMACIST

## 2025-03-05 PROCEDURE — 99212 OFFICE O/P EST SF 10 MIN: CPT | Performed by: PHARMACIST

## 2025-03-05 RX ORDER — WARFARIN SODIUM 5 MG/1
TABLET ORAL
Qty: 30 TABLET | Refills: 11 | Status: SHIPPED | OUTPATIENT
Start: 2025-03-05

## 2025-03-05 NOTE — PROGRESS NOTES
Medication Management Clinic  [facility]  Anticoagulation Clinic  [phone] (phone)  [phone] (fax)    Ms. Beverly Solares is a 69 y.o.  female with history of PE, Factor V Leiden, thrombophilia  who presents today for anticoagulation monitoring and adjustment.    Patient verifies current dosing regimen and tablet strength.  No missed or extra doses.  Patient denies s/s bleeding/bruising/swelling/SOB  No blood in urine or stool.  No dietary changes.  No changes in medication/OTC agents/Herbals.  No change in alcohol use or tobacco use.  No change in activity level.  Patient denies falls.  No vomiting/diarrhea or acute illness.   No Procedures scheduled in the future at this time.      Assessment:   Lab Results   Component Value Date    INR 2.60 (H) 03/05/2025    INR 2.20 (H) 02/05/2025    INR 2.10 (H) 01/08/2025     INR therapeutic   Recent Labs     03/05/25  1356   INR 2.60*         Plan:  Continue Coumadin 5 mg daily.  Recheck INR in 3 week(s).  Patient was reminded and given CBC lab order. Patient reminded to call the Anticoagulation Clinic with any signs or symptoms of bleeding or with any medication changes.  Patient given instructions utilizing the teach back method.      Warfarin Refill sent per [facility]  After visit summary printed and reviewed with patient.      Discharged ambulatory in no apparent distress.    For Pharmacy Admin Tracking Only  Time Spent (min): 20    Kadi Valenzuela, MarianoD, BCPS  3/5/2025  2:06 PM

## 2025-03-08 LAB
BASOPHILS ABSOLUTE: NORMAL
BASOPHILS RELATIVE PERCENT: NORMAL
EOSINOPHILS ABSOLUTE: NORMAL
EOSINOPHILS RELATIVE PERCENT: NORMAL
HCT VFR BLD CALC: 40.2 % (ref 36–46)
HEMOGLOBIN: 13.2 G/DL (ref 12–16)
LYMPHOCYTES ABSOLUTE: NORMAL
LYMPHOCYTES RELATIVE PERCENT: NORMAL
MCH RBC QN AUTO: NORMAL PG
MCHC RBC AUTO-ENTMCNC: NORMAL G/DL
MCV RBC AUTO: NORMAL FL
MONOCYTES ABSOLUTE: NORMAL
MONOCYTES RELATIVE PERCENT: NORMAL
NEUTROPHILS ABSOLUTE: NORMAL
NEUTROPHILS RELATIVE PERCENT: NORMAL
PLATELET # BLD: 289 K/ΜL
PMV BLD AUTO: NORMAL FL
RBC # BLD: 4.46 10^6/ΜL
WBC # BLD: 4.6 10^3/ML

## 2025-03-26 ENCOUNTER — TELEPHONE (OUTPATIENT)
Age: 70
End: 2025-03-26

## 2025-03-26 ENCOUNTER — ANTI-COAG VISIT (OUTPATIENT)
Age: 70
End: 2025-03-26
Payer: MEDICARE

## 2025-03-26 DIAGNOSIS — D68.2 FACTOR V DEFICIENCY (HCC): ICD-10-CM

## 2025-03-26 DIAGNOSIS — Z86.711 PERSONAL HISTORY OF PE (PULMONARY EMBOLISM): Primary | ICD-10-CM

## 2025-03-26 DIAGNOSIS — Z51.81 ENCOUNTER FOR THERAPEUTIC DRUG MONITORING: ICD-10-CM

## 2025-03-26 DIAGNOSIS — D68.59 THROMBOPHILIA: ICD-10-CM

## 2025-03-26 DIAGNOSIS — Z79.01 ANTICOAGULATED ON COUMADIN: ICD-10-CM

## 2025-03-26 LAB — POC INR: 2.4 (ref 0.8–1.2)

## 2025-03-26 PROCEDURE — 99211 OFF/OP EST MAY X REQ PHY/QHP: CPT

## 2025-03-26 PROCEDURE — 85610 PROTHROMBIN TIME: CPT

## 2025-03-26 NOTE — TELEPHONE ENCOUNTER
Notified patient that 3/8 CBC was in normal range; also received by Dr. George, this clinic's referring physician. She mentioned she hadn't seen him in awhile; advised her that should see referring physician at least once/year. If not continuing with him, would need referral from PCP.  She will let this clinic know.

## 2025-03-26 NOTE — PROGRESS NOTES
Medication Management Clinic  [facility]  Anticoagulation Clinic  [phone] (phone)  [phone] (fax)    Ms. Beverly Solares is a 69 y.o.  female with history of PE/Factor V/other thrombophilia, per Dr. George's referral, who presents today for Warfarin monitoring and adjustment (3 week visit).    Patient verifies current dosing regimen and tablet strength.  No missed or extra doses.  Patient denies bleeding/swelling/SOB.  Has usual easy bruising.  No blood in urine or stool.  No dietary changes.  No changes in medication/OTC agents/herbals.  No change in alcohol use or tobacco use.  No change in activity level.  Patient denies falls.  No vomiting/diarrhea or acute illness.   No procedures scheduled in the future at this time.      Assessment:   Lab Results   Component Value Date    INR 2.40 (H) 03/26/2025    INR 2.60 (H) 03/05/2025    INR 2.20 (H) 02/05/2025     INR therapeutic - goal 2-3.  Recent Labs     03/26/25  1343   INR 2.40*        Plan:  POCT INR performed/result reviewed.  Continue Coumadin 5 mg daily PO.  Recheck INR in 3 week(s) - with mother's appt.  Patient reminded to call the Anticoagulation Clinic with any signs or symptoms of bleeding or with any medication changes.  Patient given instructions utilizing the teach back method.   After visit summary printed and reviewed with patient.    States did routine CBC at [facility] - will obtain.  Called lab - will be faxed. Abstracted to system.  Discharged ambulatory in no apparent distress, with mother.    For Pharmacy Admin Tracking Only    Time Spent (min): 20

## 2025-04-15 ENCOUNTER — OFFICE VISIT (OUTPATIENT)
Dept: UROLOGY | Age: 70
End: 2025-04-15

## 2025-04-15 VITALS
WEIGHT: 232.6 LBS | DIASTOLIC BLOOD PRESSURE: 68 MMHG | BODY MASS INDEX: 37.38 KG/M2 | SYSTOLIC BLOOD PRESSURE: 122 MMHG | HEIGHT: 66 IN

## 2025-04-15 DIAGNOSIS — R31.29 MICROSCOPIC HEMATURIA: ICD-10-CM

## 2025-04-15 DIAGNOSIS — R32 URINARY INCONTINENCE, UNSPECIFIED TYPE: Primary | ICD-10-CM

## 2025-04-15 LAB
BACTERIA: NORMAL
BILIRUB UR QL STRIP: NEGATIVE
BILIRUBIN, URINE: NEGATIVE
BLOOD URINE, POC: NORMAL
CASTS #/AREA URNS LPF: NORMAL /LPF
CASTS #/AREA URNS LPF: NORMAL /LPF
CHARACTER UR: CLEAR
CHARACTER, URINE: CLEAR
CHARCOAL URNS QL MICRO: NORMAL
COLOR UR: YELLOW
COLOR, UA: YELLOW
CRYSTALS URNS QL MICRO: NORMAL
EPITHELIAL CELLS, UA: NORMAL /HPF
GLUCOSE UR QL STRIP.AUTO: NEGATIVE MG/DL
GLUCOSE URINE: NEGATIVE MG/DL
HGB UR QL STRIP.AUTO: NEGATIVE
KETONES UR QL STRIP.AUTO: NEGATIVE
KETONES, URINE: NEGATIVE
LEUKOCYTE CLUMPS, URINE: NEGATIVE
LEUKOCYTE ESTERASE UR QL STRIP.AUTO: NEGATIVE
NITRITE UR QL STRIP.AUTO: NEGATIVE
NITRITE, URINE: NEGATIVE
PH UR STRIP.AUTO: 6.5 [PH] (ref 5–9)
PH, URINE: 6 (ref 5–9)
POST VOID RESIDUAL (PVR): 0 ML
PROT UR STRIP.AUTO-MCNC: NEGATIVE MG/DL
PROTEIN, URINE: NEGATIVE MG/DL
RBC #/AREA URNS HPF: NORMAL /HPF
RENAL EPI CELLS #/AREA URNS HPF: NORMAL /[HPF]
SPECIFIC GRAVITY UA: < 1.005 (ref 1–1.03)
SPECIFIC GRAVITY UA: <= 1.005 (ref 1–1.03)
UROBILINOGEN, URINE: 0.2 EU/DL (ref 0–1)
UROBILINOGEN, URINE: 0.2 EU/DL (ref 0–1)
WBC #/AREA URNS HPF: NORMAL /HPF
YEAST LIKE FUNGI URNS QL MICRO: NORMAL

## 2025-04-15 RX ORDER — MIRABEGRON 50 MG/1
50 TABLET, FILM COATED, EXTENDED RELEASE ORAL DAILY
Qty: 90 TABLET | Refills: 3 | Status: SHIPPED | OUTPATIENT
Start: 2025-04-15

## 2025-04-15 ASSESSMENT — ENCOUNTER SYMPTOMS
BACK PAIN: 0
VOMITING: 0
NAUSEA: 0
ABDOMINAL PAIN: 0

## 2025-04-15 NOTE — PROGRESS NOTES
[facility] [place] [facility]  [facility]  [address]  [address]  [facility] [address]  Dept: [phone]  Loc: [phone]    Visit Date: 4/15/2025        HPI:     Beverly Solares is a 69 y.o. female who presents today for:  Chief Complaint   Patient presents with    Incontinence       HPI    Pt seen in follow up for urinary incontinence.       Gayle has a hx of urinary incontinence spanning years that worsened in 2021.  Notes episodes of complete incontinence.  Not significant frequency or urgency.  Notes \"it just happens and I don't really have control\". Had side effects with Oxybutynin.  Vesicare improved symptoms but it had side effects with a foul taste in the mouth and she had to stop it.  Doing well with Myrbetriq 50 mg daily.       Current Outpatient Medications   Medication Sig Dispense Refill    warfarin (COUMADIN) 5 MG tablet Take as directed by [facility] 30 tabs = 30 days 30 tablet 11    rosuvastatin (CRESTOR) 10 MG tablet Take 1 tablet by mouth nightly      Fexofenadine HCl (ALLEGRA PO) Take by mouth once a week      acetaminophen (TYLENOL) 500 MG tablet Take 1 tablet by mouth every 6 hours as needed for Pain or Fever Don't take more than 3,000 mg each day.      metFORMIN (GLUCOPHAGE) 500 MG tablet Take 1 tablet by mouth daily (with breakfast)      mirabegron (MYRBETRIQ) 50 MG TB24 Take 50 mg by mouth daily 90 tablet 3    traMADol (ULTRAM) 50 MG tablet TAKE 1 TABLET BY MOUTH EVERY 4 HOURS AS NEEDED FOR PAIN. MAX 4 TABLETS DAILY. DO NOT FILL UNTIL 10/12/2022      gabapentin (NEURONTIN) 300 MG capsule TAKE 2 CAPSULES BY MOUTH THREE TIMES DAILY. DO NOT REILL UNTIL 10/08/22      Ascorbic Acid (VITAMIN C PO) Take by mouth      ZINC PO Take by mouth daily      VITAMIN A PO Take by mouth daily      vitamin D (CHOLECALCIFEROL) 5000 UNITS CAPS capsule Take 7,500 Units by mouth daily Alternate between 5000 and 79499       No current facility-administered medications

## 2025-04-16 ENCOUNTER — ANTI-COAG VISIT (OUTPATIENT)
Age: 70
End: 2025-04-16
Payer: MEDICARE

## 2025-04-16 ENCOUNTER — RESULTS FOLLOW-UP (OUTPATIENT)
Dept: UROLOGY | Age: 70
End: 2025-04-16

## 2025-04-16 DIAGNOSIS — Z79.01 ANTICOAGULATED ON COUMADIN: ICD-10-CM

## 2025-04-16 DIAGNOSIS — D68.2 FACTOR V DEFICIENCY (HCC): ICD-10-CM

## 2025-04-16 DIAGNOSIS — Z86.711 PERSONAL HISTORY OF PE (PULMONARY EMBOLISM): Primary | ICD-10-CM

## 2025-04-16 DIAGNOSIS — D68.59 THROMBOPHILIA: ICD-10-CM

## 2025-04-16 DIAGNOSIS — Z51.81 ENCOUNTER FOR THERAPEUTIC DRUG MONITORING: ICD-10-CM

## 2025-04-16 LAB — POC INR: 2.7 (ref 0.8–1.2)

## 2025-04-16 PROCEDURE — 99211 OFF/OP EST MAY X REQ PHY/QHP: CPT

## 2025-04-16 PROCEDURE — 85610 PROTHROMBIN TIME: CPT

## 2025-04-16 NOTE — PROGRESS NOTES
[facility] Management Clinic  [facility]  Anticoagulation Clinic  [phone] (phone)  [phone] (fax)    Ms. Beverly Solares is a 69 y.o.  female with history of PE/Factor V/other thrombophilia, per Dr. George's referral, who presents today for Warfarin monitoring and adjustment (3 week visit).    Patient verifies current dosing regimen and tablet strength.  No missed or extra doses.  Patient denies bleeding/swelling/SOB.  Has usual easy bruising.  No blood in urine or stool.  No dietary changes.  No changes in medication/OTC agents/herbals.  No change in alcohol use or tobacco use.  No change in activity level.  Patient denies falls.  No vomiting/diarrhea or acute illness.   No procedures scheduled in the future at this time.      Assessment:       INR goal: 2.0-3.0  Lab Results   Component Value Date    INR 2.70 (H) 04/16/2025    INR 2.40 (H) 03/26/2025    INR 2.60 (H) 03/05/2025     INR therapeutic   Recent Labs     04/16/25  1347   INR 2.70*      Plan:  POCT INR performed/result reviewed.  Continue Coumadin 5 mg daily PO.  Recheck INR in 4 week(s).  Patient reminded to call the Anticoagulation Clinic with any signs or symptoms of bleeding or with any medication changes.  Patient given instructions utilizing the teach back method.    After visit summary printed and reviewed with patient.      Discharged ambulatory in no apparent distress.    For Pharmacy Admin Tracking Only    Time Spent (min): 20

## 2025-05-15 ENCOUNTER — ANTI-COAG VISIT (OUTPATIENT)
Age: 70
End: 2025-05-15
Payer: MEDICARE

## 2025-05-15 DIAGNOSIS — Z79.01 ANTICOAGULATED ON COUMADIN: ICD-10-CM

## 2025-05-15 DIAGNOSIS — D68.59 THROMBOPHILIA: ICD-10-CM

## 2025-05-15 DIAGNOSIS — Z51.81 ENCOUNTER FOR THERAPEUTIC DRUG MONITORING: ICD-10-CM

## 2025-05-15 DIAGNOSIS — D68.2 FACTOR V DEFICIENCY (HCC): ICD-10-CM

## 2025-05-15 DIAGNOSIS — Z86.711 PERSONAL HISTORY OF PE (PULMONARY EMBOLISM): Primary | ICD-10-CM

## 2025-05-15 LAB — POC INR: 2.9 (ref 0.8–1.2)

## 2025-05-15 PROCEDURE — 99211 OFF/OP EST MAY X REQ PHY/QHP: CPT

## 2025-05-15 PROCEDURE — 85610 PROTHROMBIN TIME: CPT

## 2025-05-15 NOTE — PROGRESS NOTES
Medication Management Clinic  [facility]  Anticoagulation Clinic  [phone] (phone)  [phone] (fax)    Ms. Beverly Solares is a 69 y.o.  female with history of PE/Factor V/other thrombophilia, per Dr. George's referral, who presents today for Warfarin monitoring and adjustment (4 week visit).    Patient verifies current dosing regimen and tablet strength.  No missed or extra doses.  Patient denies bleeding/swelling/SOB.  Has usual easy bruising.  No blood in urine or stool.  No dietary changes.  No changes in medication/OTC agents/herbals.  No change in alcohol use or tobacco use.  No change in activity level.  Patient denies falls.  No vomiting/diarrhea or acute illness.   No procedures scheduled in the future at this time.      Assessment:       INR goal: 2.0-3.0  Lab Results   Component Value Date    INR 2.90 (H) 05/15/2025    INR 2.70 (H) 04/16/2025    INR 2.40 (H) 03/26/2025     INR therapeutic   Recent Labs     05/15/25  1401   INR 2.90*      Plan:  POCT INR performed/result reviewed.  Continue Coumadin 5 mg daily PO.  Recheck INR in 5 week(s).  Patient reminded to call the Anticoagulation Clinic with any signs or symptoms of bleeding or with any medication changes.  Patient given instructions utilizing the teach back method.     After visit summary printed and reviewed with patient.      Discharged ambulatory in no apparent distress, with mother.    For Pharmacy Admin Tracking Only    Time Spent (min): 20

## 2025-06-18 ENCOUNTER — TELEPHONE (OUTPATIENT)
Age: 70
End: 2025-06-18

## 2025-06-18 ENCOUNTER — ANTI-COAG VISIT (OUTPATIENT)
Age: 70
End: 2025-06-18
Payer: MEDICARE

## 2025-06-18 DIAGNOSIS — Z86.711 PERSONAL HISTORY OF PE (PULMONARY EMBOLISM): Primary | ICD-10-CM

## 2025-06-18 DIAGNOSIS — Z51.81 ENCOUNTER FOR THERAPEUTIC DRUG MONITORING: ICD-10-CM

## 2025-06-18 DIAGNOSIS — Z79.01 ANTICOAGULATED ON COUMADIN: ICD-10-CM

## 2025-06-18 DIAGNOSIS — D68.2 FACTOR V DEFICIENCY (HCC): ICD-10-CM

## 2025-06-18 DIAGNOSIS — R79.1 ABNORMAL COAGULATION PROFILE: ICD-10-CM

## 2025-06-18 DIAGNOSIS — D68.59 THROMBOPHILIA: ICD-10-CM

## 2025-06-18 LAB — POC INR: 2.1 (ref 0.8–1.2)

## 2025-06-18 PROCEDURE — 85610 PROTHROMBIN TIME: CPT

## 2025-06-18 PROCEDURE — 99211 OFF/OP EST MAY X REQ PHY/QHP: CPT

## 2025-06-18 NOTE — TELEPHONE ENCOUNTER
Called Dr. George's office to determine what \"other thrombophilia\" patient has as marked on referral.  Was told patient has not been seen there in 2 years. Nurse will check chart and call back.

## 2025-06-18 NOTE — PROGRESS NOTES
Medication Management Clinic  [facility]  Anticoagulation Clinic  [phone] (phone)  [phone] (fax)    Ms. Beverly Solares is a 69 y.o.  female with history of PE/Factor V/other thrombophilia, per Dr. George's referral, who presents today for Warfarin monitoring and adjustment (5 week visit).    Patient verifies current dosing regimen and tablet strength.  No missed or extra doses.  Patient denies bleeding/swelling/SOB.  Has usual easy bruising.  No blood in urine or stool.  No dietary changes.  No changes in medication/OTC agents/herbals.  No change in alcohol use or tobacco use.  No change in activity level.  Patient denies falls.  No vomiting/diarrhea or acute illness.   No procedures scheduled in the future at this time.      Assessment:       INR goal: 2.0-3.0  Lab Results   Component Value Date    INR 2.10 (H) 06/18/2025    INR 2.90 (H) 05/15/2025    INR 2.70 (H) 04/16/2025     INR therapeutic   Recent Labs     06/18/25  1345   INR 2.10*      Plan:  POCT INR performed/result reviewed  Continue Coumadin 5 mg daily PO.  Recheck INR in 4 week(s).  Patient reminded to call the Anticoagulation Clinic with any signs or symptoms of bleeding or with any medication changes.  Patient given instructions utilizing the teach back method.     After visit summary printed and reviewed with patient.      Discharged ambulatory in no apparent distress, with mother.    For Pharmacy Admin Tracking Only    Time Spent (min): 21

## 2025-06-19 NOTE — TELEPHONE ENCOUNTER
Received message on voicemail from Dalia at Dr. George's office that only clotting disorder for patient is Factor V; code D68.69 is primary hypercoagulable state.  Called office to see if they are planning to follow patient. Has appt. next week.  Faxed corrected referral to office for signature.

## 2025-07-16 ENCOUNTER — ANTI-COAG VISIT (OUTPATIENT)
Age: 70
End: 2025-07-16
Payer: MEDICARE

## 2025-07-16 DIAGNOSIS — D68.59 PRIMARY HYPERCOAGULABLE STATE: ICD-10-CM

## 2025-07-16 DIAGNOSIS — Z79.01 ANTICOAGULATED ON COUMADIN: ICD-10-CM

## 2025-07-16 DIAGNOSIS — Z86.711 PERSONAL HISTORY OF PE (PULMONARY EMBOLISM): Primary | ICD-10-CM

## 2025-07-16 DIAGNOSIS — Z51.81 ENCOUNTER FOR THERAPEUTIC DRUG MONITORING: ICD-10-CM

## 2025-07-16 DIAGNOSIS — R79.1 ABNORMAL COAGULATION PROFILE: ICD-10-CM

## 2025-07-16 DIAGNOSIS — D68.2 FACTOR V DEFICIENCY (HCC): ICD-10-CM

## 2025-07-16 LAB — POC INR: 3 (ref 0.8–1.2)

## 2025-07-16 PROCEDURE — 85610 PROTHROMBIN TIME: CPT

## 2025-07-16 PROCEDURE — 99211 OFF/OP EST MAY X REQ PHY/QHP: CPT

## 2025-07-16 NOTE — PROGRESS NOTES
Medication Management Clinic  [facility]  Anticoagulation Clinic  [phone] (phone)  [phone] (fax)    Ms. Beverly Solares is a 70 y.o.  female with history of Factor V/abnormal coagulation profile/PE/primary hypercoagulable state, per Dr. George's referral, who presents today for Warfarin monitoring and adjustment (4 week visit).    Patient verifies current dosing regimen and tablet strength.  No missed or extra doses.  Patient denies bleeding/swelling/SOB.  Has usual easy bruising.  No blood in urine or stool.  No dietary changes.  No changes in medication/OTC agents/herbals.  No change in alcohol use or tobacco use.  No change in activity level.  Patient denies falls.  No vomiting/diarrhea or acute illness.   No procedures scheduled in the future at this time.      Assessment:       INR goal: 2.0-3.0  Lab Results   Component Value Date    INR 3.00 (H) 07/16/2025    INR 2.10 (H) 06/18/2025    INR 2.90 (H) 05/15/2025     INR therapeutic   Recent Labs     07/16/25  1348   INR 3.00*      Plan:  POCT INR performed/result reviewed.  Continue Coumadin 5 mg daily PO.  Recheck INR in 3 week(s) - to stay on same day as mother's visit.  Patient reminded to call the Anticoagulation Clinic with any signs or symptoms of bleeding or with any medication changes.  Patient given instructions utilizing the teach back method.     After visit summary printed and reviewed with patient.      Discharged ambulatory in no apparent distress, with mother.    For Pharmacy Admin Tracking Only    Time Spent (min): 15

## 2025-08-06 ENCOUNTER — ANTI-COAG VISIT (OUTPATIENT)
Age: 70
End: 2025-08-06
Payer: MEDICARE

## 2025-08-06 DIAGNOSIS — Z51.81 ENCOUNTER FOR THERAPEUTIC DRUG MONITORING: ICD-10-CM

## 2025-08-06 DIAGNOSIS — Z86.711 PERSONAL HISTORY OF PE (PULMONARY EMBOLISM): Primary | ICD-10-CM

## 2025-08-06 DIAGNOSIS — R79.1 ABNORMAL COAGULATION PROFILE: ICD-10-CM

## 2025-08-06 DIAGNOSIS — D68.2 FACTOR V DEFICIENCY (HCC): ICD-10-CM

## 2025-08-06 DIAGNOSIS — D68.59 PRIMARY HYPERCOAGULABLE STATE: ICD-10-CM

## 2025-08-06 DIAGNOSIS — Z79.01 ANTICOAGULATED ON COUMADIN: ICD-10-CM

## 2025-08-06 LAB — POC INR: 2.6 (ref 0.8–1.2)

## 2025-08-06 PROCEDURE — 85610 PROTHROMBIN TIME: CPT

## 2025-08-06 PROCEDURE — 99211 OFF/OP EST MAY X REQ PHY/QHP: CPT
